# Patient Record
Sex: FEMALE | Race: BLACK OR AFRICAN AMERICAN | NOT HISPANIC OR LATINO | Employment: FULL TIME | URBAN - METROPOLITAN AREA
[De-identification: names, ages, dates, MRNs, and addresses within clinical notes are randomized per-mention and may not be internally consistent; named-entity substitution may affect disease eponyms.]

---

## 2017-07-07 ENCOUNTER — ALLSCRIPTS OFFICE VISIT (OUTPATIENT)
Dept: OTHER | Facility: OTHER | Age: 32
End: 2017-07-07

## 2017-07-07 ENCOUNTER — GENERIC CONVERSION - ENCOUNTER (OUTPATIENT)
Dept: OTHER | Facility: OTHER | Age: 32
End: 2017-07-07

## 2017-07-18 ENCOUNTER — ALLSCRIPTS OFFICE VISIT (OUTPATIENT)
Dept: OTHER | Facility: OTHER | Age: 32
End: 2017-07-18

## 2017-07-21 LAB
. (HISTORICAL): NORMAL

## 2017-07-23 ENCOUNTER — GENERIC CONVERSION - ENCOUNTER (OUTPATIENT)
Dept: OTHER | Facility: OTHER | Age: 32
End: 2017-07-23

## 2017-07-24 ENCOUNTER — GENERIC CONVERSION - ENCOUNTER (OUTPATIENT)
Dept: OTHER | Facility: OTHER | Age: 32
End: 2017-07-24

## 2018-01-12 VITALS
OXYGEN SATURATION: 96 % | WEIGHT: 150 LBS | HEART RATE: 80 BPM | DIASTOLIC BLOOD PRESSURE: 80 MMHG | HEIGHT: 64 IN | SYSTOLIC BLOOD PRESSURE: 124 MMHG | BODY MASS INDEX: 25.61 KG/M2

## 2018-01-12 NOTE — RESULT NOTES
Verified Results  [facility]) Pathology Report [id number] 09:48AM Manfred Anderson     Test Name Result Flag Reference     Comment     Material submitted:                                          SKIN BIOPSY, RIGHT LEG     Comment     Clinician provided ICD-10:  L81 9     Comment     Clinical history:                                            SUSPICIOUS LESION     Comment     **********************************************************************  Diagnosis:  SKIN BIOPSY, RIGHT LEG:  COMPOUND DYSPLASTIC NEVUS WITH MODERATE ATYPIA  MARGINS CLEAR  NOTE:  DEEPER SECTIONS, HMB45 AND MELAN A ARE CONFIRMATORY  NO PAGETOID NESTS  CONTROL(S) STAIN(S) APPROPRIATELY  [facility]/07/21/2017  **********************************************************************     Comment     Electronically signed:                                       Katlyn Joy MD, Pathologist     Comment     Gross description:                                            1 Container, formalin-filled, labeled with patient identification  SKIN BIOPSY, RIGHT LEG:  Received labeled RIGHT LEG is a tan skin ellipse measuring 1 0 x 0 4  x 0 1 cm  The specimen has a lesion measuring 0 2 x 0 2 cm  The  lesion lies within 1 mm of the closest margin  The specimen is inked  and sectioned serially with the ends submitted in cassette 1 and the  remaining sections in cassette(s) 2   /PER  /PER     Comment     Pathologist provided ICD-10:  D48 5     Comment     CPT                                                            221174, [id number], [id number]

## 2018-01-13 VITALS
WEIGHT: 150 LBS | OXYGEN SATURATION: 99 % | BODY MASS INDEX: 25.61 KG/M2 | SYSTOLIC BLOOD PRESSURE: 110 MMHG | DIASTOLIC BLOOD PRESSURE: 60 MMHG | HEART RATE: 60 BPM | RESPIRATION RATE: 16 BRPM | HEIGHT: 64 IN

## 2018-01-14 NOTE — RESULT NOTES
Message   Recorded as Task   Date: 07/23/2017 11:12 AM, Created By: Omega Barroso   Task Name: Call Patient with results   Assigned To:  Alfredito Lee   Regarding Patient: Ren Guerra, Status: Active   CommentCleave Rough - 23 Jul 2017 11:12 AM     Patient Phone: [phone]    pre-cancer --clean margins   Maureen Holloway - 24 Jul 2017 9:58 AM     TASK EDITED  Results left on C#        Signatures   Electronically signed by : Carlos Arzate, ; Jul 24 2017  9:59AM EST                       (Author)

## 2018-01-27 ENCOUNTER — OFFICE VISIT (OUTPATIENT)
Dept: FAMILY MEDICINE CLINIC | Facility: CLINIC | Age: 33
End: 2018-01-27
Payer: COMMERCIAL

## 2018-01-27 VITALS
HEART RATE: 96 BPM | DIASTOLIC BLOOD PRESSURE: 72 MMHG | BODY MASS INDEX: 23.9 KG/M2 | HEIGHT: 64 IN | SYSTOLIC BLOOD PRESSURE: 116 MMHG | WEIGHT: 140 LBS | TEMPERATURE: 97.8 F | OXYGEN SATURATION: 98 %

## 2018-01-27 DIAGNOSIS — J01.00 ACUTE NON-RECURRENT MAXILLARY SINUSITIS: Primary | ICD-10-CM

## 2018-01-27 DIAGNOSIS — K12.1 MOUTH ULCERS: ICD-10-CM

## 2018-01-27 PROCEDURE — 99214 OFFICE O/P EST MOD 30 MIN: CPT | Performed by: FAMILY MEDICINE

## 2018-01-27 RX ORDER — AMOXICILLIN AND CLAVULANATE POTASSIUM 875; 125 MG/1; MG/1
1 TABLET, FILM COATED ORAL EVERY 12 HOURS SCHEDULED
Qty: 20 TABLET | Refills: 0 | Status: SHIPPED | OUTPATIENT
Start: 2018-01-27 | End: 2018-02-06

## 2018-01-27 RX ORDER — LEVONORGESTREL / ETHINYL ESTRADIOL AND ETHINYL ESTRADIOL 150-30(84)
1 KIT ORAL DAILY
Refills: 0 | COMMUNITY
Start: 2018-01-06 | End: 2020-11-02 | Stop reason: ALTCHOICE

## 2018-01-27 RX ORDER — DESVENLAFAXINE 50 MG/1
50 TABLET, EXTENDED RELEASE ORAL DAILY
Refills: 0 | COMMUNITY
Start: 2018-01-04 | End: 2020-01-22 | Stop reason: DRUGHIGH

## 2018-01-27 RX ORDER — VALACYCLOVIR HYDROCHLORIDE 1 G/1
TABLET, FILM COATED ORAL
Qty: 60 TABLET | Refills: 2 | Status: SHIPPED | OUTPATIENT
Start: 2018-01-27 | End: 2020-01-22 | Stop reason: ALTCHOICE

## 2018-01-27 RX ORDER — LORAZEPAM 0.5 MG/1
1 TABLET ORAL
COMMUNITY
Start: 2015-11-18 | End: 2018-02-14 | Stop reason: SDUPTHER

## 2018-01-27 RX ORDER — TRIAMCINOLONE ACETONIDE 0.1 %
1 PASTE (GRAM) DENTAL 4 TIMES DAILY
Qty: 5 G | Refills: 2 | Status: SHIPPED | OUTPATIENT
Start: 2018-01-27 | End: 2019-03-07 | Stop reason: ALTCHOICE

## 2018-01-27 NOTE — PROGRESS NOTES
Assessment/Plan:    No problem-specific Assessment & Plan notes found for this encounter  Diagnoses and all orders for this visit:    Acute non-recurrent maxillary sinusitis  -     amoxicillin-clavulanate (AUGMENTIN) 875-125 mg per tablet; Take 1 tablet by mouth every 12 (twelve) hours for 10 days    Mouth ulcers  -     triamcinolone (KENALOG) 0 1 % oral topical paste; Apply 1 application topically 4 (four) times a day  -     valACYclovir (VALTREX) 1,000 mg tablet; Take twice daily X 7 days, then continue 1 tab daily    Other orders  -     LORazepam (ATIVAN) 0 5 mg tablet; Take 1 tablet by mouth  -     CAMRESE 0 15-0 03 &0 01 MG TABS; Take 1 tablet by mouth daily  -     desvenlafaxine succinate (PRISTIQ) 50 mg 24 hr tablet; Take 50 mg by mouth daily          Augmentin times 10 days for sinus infection  Will give a trial of Kenalog in Or[place] for the acute ulcers, and also oral Valtrex twice daily for 1 week and then once daily as a suppressive attempt  Progress report on the persistent ulcers in 3-6 weeks  Work note provided  Subjective:      Patient ID: Raymond Ballesteros is a 29 y o  female  Acute visit for illness -patient been ill for the last 2 days with cough, nasal congestion, muscle aches, and headaches  She has a dry throat  No fevers have been reported  The following portions of the patient's history were reviewed and updated as appropriate: allergies, current medications, past family history, past medical history, past social history and problem list     Review of Systems   Constitutional: Positive for chills and fatigue  Negative for diaphoresis and fever  HENT: Positive for congestion, ear pain, sinus pressure, sore throat and trouble swallowing  Negative for rhinorrhea  Eyes: Negative for discharge, redness and itching  Respiratory: Positive for cough  Negative for chest tightness, shortness of breath and wheezing  Cardiovascular: Negative for chest pain and palpitations  Rapid or slow heart rate   Gastrointestinal: Positive for diarrhea and nausea  Negative for abdominal pain and vomiting  Objective:     Physical Exam   Constitutional: She appears well-developed and well-nourished  No distress  HENT:   Head: Normocephalic and atraumatic  Right Ear: Tympanic membrane and external ear normal  No drainage or tenderness  Left Ear: Tympanic membrane normal  No drainage or tenderness  Nose: Mucosal edema and rhinorrhea present  Mouth/Throat: Posterior oropharyngeal erythema present  Multiple ulceration at nares  Ulcers across lower lip mucus membranes   Eyes: Conjunctivae and EOM are normal  Pupils are equal, round, and reactive to light  Neck: Normal range of motion  Neck supple  Cardiovascular: Normal rate, regular rhythm and normal heart sounds  Pulmonary/Chest: Effort normal  No respiratory distress  She has no wheezes  She has no rales  Abdominal: Soft  Lymphadenopathy:     She has cervical adenopathy  Skin: Skin is warm and dry     Mucosal ulcers in mouth

## 2018-01-27 NOTE — LETTER
January 27, 2018     Patient: Graydon Goldberg   YOB: 1985   Date of Visit: 1/27/2018       To Whom it May Concern:    Graydon Goldberg is under my professional care  She was seen in my office on 1/27/2018  She may return to work on 1/29/2018  Please excuse work absence due to illness  If you have any questions or concerns, please don't hesitate to call           Sincerely,          Saeed Agrawal MD        CC: No Recipients

## 2018-01-27 NOTE — PATIENT INSTRUCTIONS
Augmentin times 10 days for sinus infection  Will give a trial of Kenalog in Or[place] for the acute ulcers, and also oral Valtrex twice daily for 1 week and then once daily as a suppressive attempt  Progress report on the persistent ulcers in 3-6 weeks  Work note provided

## 2018-02-02 ENCOUNTER — OFFICE VISIT (OUTPATIENT)
Dept: URGENT CARE | Facility: CLINIC | Age: 33
End: 2018-02-02
Payer: COMMERCIAL

## 2018-02-02 ENCOUNTER — TELEPHONE (OUTPATIENT)
Dept: FAMILY MEDICINE CLINIC | Facility: CLINIC | Age: 33
End: 2018-02-02

## 2018-02-02 VITALS
WEIGHT: 146 LBS | OXYGEN SATURATION: 97 % | DIASTOLIC BLOOD PRESSURE: 91 MMHG | RESPIRATION RATE: 18 BRPM | BODY MASS INDEX: 24.92 KG/M2 | HEART RATE: 87 BPM | TEMPERATURE: 98.8 F | SYSTOLIC BLOOD PRESSURE: 148 MMHG | HEIGHT: 64 IN

## 2018-02-02 DIAGNOSIS — G44.52 NEW DAILY PERSISTENT HEADACHE: ICD-10-CM

## 2018-02-02 DIAGNOSIS — J06.9 VIRAL UPPER RESPIRATORY TRACT INFECTION: Primary | ICD-10-CM

## 2018-02-02 PROCEDURE — 99203 OFFICE O/P NEW LOW 30 MIN: CPT | Performed by: PREVENTIVE MEDICINE

## 2018-02-02 RX ORDER — BUTALBITAL, ACETAMINOPHEN AND CAFFEINE 50; 325; 40 MG/1; MG/1; MG/1
1 TABLET ORAL EVERY 4 HOURS PRN
Qty: 15 TABLET | Refills: 0
Start: 2018-02-02 | End: 2019-03-07 | Stop reason: ALTCHOICE

## 2018-02-02 RX ORDER — FLUOXETINE HYDROCHLORIDE 20 MG/1
CAPSULE ORAL
COMMUNITY
Start: 2016-06-08 | End: 2019-03-07 | Stop reason: ALTCHOICE

## 2018-02-02 NOTE — TELEPHONE ENCOUNTER
Spoke to pt--is aware possible flu--to continue with the instructions given yesterday, plenty of fluids, tylenol/motrin  Sohan Valenzuela

## 2018-02-02 NOTE — TELEPHONE ENCOUNTER
HEADACHES,NAUSEA,AND FELL EXTREMLY WEEK  Antonieta Jurist DID EVERYTHING WE TOLD HER TO Do   WHAT TO DO NEXT

## 2018-02-03 NOTE — PROGRESS NOTES
Assessment/Plan:      Diagnoses and all orders for this visit:    Viral upper respiratory tract infection  -     butalbital-acetaminophen-caffeine (FIORICET,ESGIC) -40 mg per tablet; Take 1 tablet by mouth every 4 (four) hours as needed for headaches for up to 15 doses    New daily persistent headache    Other orders  -     FLUoxetine (PROZAC) 20 mg capsule; Take by mouth          Subjective:     Patient ID: Alexi Purcell is a 29 y o  female  Multiple complaints  She saw her family doctor approximately a week ago for nausea congestion and headache she was put on amoxicillin for which she is still taking  Today she has ongoing nausea she has severe headaches which alternate between temporal size of the face and she has head congestion and cough  Sinusitis   Associated symptoms include headaches and neck pain  Headache    Associated symptoms include nausea and neck pain  Dental Pain      Neck Pain    Associated symptoms include headaches  Nausea   Associated symptoms include fatigue, headaches, nausea and neck pain  Fatigue   Associated symptoms include fatigue, headaches, nausea and neck pain  Mouth Lesions    Associated symptoms include nausea, headaches, mouth sores and neck pain  Review of Systems   Constitutional: Positive for fatigue  HENT: Positive for mouth sores  Gastrointestinal: Positive for nausea  Musculoskeletal: Positive for neck pain  Neurological: Positive for headaches  Objective:     Physical Exam   Constitutional: She appears well-developed  HENT:   Right Ear: External ear normal    Left Ear: External ear normal    Mouth/Throat: Oropharynx is clear and moist  No oropharyngeal exudate  Cardiovascular: Regular rhythm and normal heart sounds  Pulmonary/Chest: Breath sounds normal    Abdominal: Soft  She exhibits no mass  There is no tenderness  There is no rebound  Lymphadenopathy:     She has no cervical adenopathy

## 2018-02-03 NOTE — PATIENT INSTRUCTIONS
If you're not improving in the next 2-3 days you must be recheck early next week by her family physician

## 2018-02-14 ENCOUNTER — OFFICE VISIT (OUTPATIENT)
Dept: FAMILY MEDICINE CLINIC | Facility: CLINIC | Age: 33
End: 2018-02-14
Payer: COMMERCIAL

## 2018-02-14 VITALS
SYSTOLIC BLOOD PRESSURE: 120 MMHG | HEIGHT: 64 IN | BODY MASS INDEX: 23.9 KG/M2 | DIASTOLIC BLOOD PRESSURE: 84 MMHG | WEIGHT: 140 LBS

## 2018-02-14 DIAGNOSIS — J01.01 ACUTE RECURRENT MAXILLARY SINUSITIS: Primary | ICD-10-CM

## 2018-02-14 DIAGNOSIS — F41.9 ANXIETY: ICD-10-CM

## 2018-02-14 PROCEDURE — 99213 OFFICE O/P EST LOW 20 MIN: CPT | Performed by: FAMILY MEDICINE

## 2018-02-14 RX ORDER — HYDROCODONE BITARTRATE AND ACETAMINOPHEN 5; 325 MG/1; MG/1
1 TABLET ORAL EVERY 6 HOURS PRN
Qty: 20 TABLET | Refills: 0 | Status: SHIPPED | OUTPATIENT
Start: 2018-02-14 | End: 2019-03-07 | Stop reason: ALTCHOICE

## 2018-02-14 RX ORDER — AZITHROMYCIN 250 MG/1
TABLET, FILM COATED ORAL
Qty: 6 TABLET | Refills: 0 | Status: SHIPPED | OUTPATIENT
Start: 2018-02-14 | End: 2018-02-19

## 2018-02-14 RX ORDER — LORAZEPAM 0.5 MG/1
0.5 TABLET ORAL EVERY 8 HOURS PRN
Qty: 60 TABLET | Refills: 5 | Status: SHIPPED | OUTPATIENT
Start: 2018-02-14 | End: 2018-11-09 | Stop reason: SDUPTHER

## 2018-02-14 NOTE — PROGRESS NOTES
Assessment/Plan:    No problem-specific Assessment & Plan notes found for this encounter  Diagnoses and all orders for this visit:    Acute recurrent maxillary sinusitis  -     azithromycin (ZITHROMAX) 250 mg tablet; 2 tabs Day 1, then 1 tab daily X 4 days  -     HYDROcodone-acetaminophen (NORCO) 5-325 mg per tablet; Take 1 tablet by mouth every 6 (six) hours as needed for pain Max Daily Amount: 4 tablets    Anxiety  -     LORazepam (ATIVAN) 0 5 mg tablet; Take 1 tablet (0 5 mg total) by mouth every 8 (eight) hours as needed for anxiety        Patient Instructions   incr fluids/add Zpk    Patient Instructions   incr fluids/add Zpk        Subjective: No chief complaint on file  Patient ID: Milad Drew is a 29 y o  female  Sinus pain worse---no resp to amox            Review of Systems   Constitutional: Negative for appetite change, chills, diaphoresis and fever  HENT: Positive for sinus pain and sinus pressure  Negative for congestion, ear discharge, ear pain, rhinorrhea and sore throat  Eyes: Negative for discharge, redness and itching  Respiratory: Negative for cough, chest tightness, shortness of breath and wheezing  Cardiovascular: Negative for chest pain and palpitations  Rapid or slow heart rate   Gastrointestinal: Negative for abdominal pain, diarrhea, nausea and vomiting  Objective: There were no vitals filed for this visit  Physical Exam   Constitutional: She is oriented to person, place, and time  She appears well-developed and well-nourished  No distress  HENT:   Head: Normocephalic and atraumatic  Right Ear: Tympanic membrane, external ear and ear canal normal    Left Ear: Tympanic membrane, external ear and ear canal normal    Nose: Rhinorrhea present  No epistaxis  Right sinus exhibits maxillary sinus tenderness  Left sinus exhibits maxillary sinus tenderness     Mouth/Throat: Uvula is midline, oropharynx is clear and moist and mucous membranes are normal    Cardiovascular: Normal rate, regular rhythm and normal heart sounds  Exam reveals no gallop and no friction rub  No murmur heard  Pulmonary/Chest: Effort normal and breath sounds normal  No respiratory distress  She has no wheezes  She has no rales  Cough noted   Abdominal: She exhibits no distension  There is no tenderness  Lymphadenopathy:        Head (right side): Submandibular adenopathy present  Head (left side): Submandibular adenopathy present  Neurological: She is alert and oriented to person, place, and time  Skin: She is not diaphoretic  Psychiatric: She has a normal mood and affect  Her behavior is normal  Judgment and thought content normal    Nursing note and vitals reviewed      Patient Instructions     Patient Instructions

## 2018-02-19 ENCOUNTER — TELEPHONE (OUTPATIENT)
Dept: FAMILY MEDICINE CLINIC | Facility: CLINIC | Age: 33
End: 2018-02-19

## 2018-02-19 DIAGNOSIS — R05.9 COUGH: Primary | ICD-10-CM

## 2018-02-19 RX ORDER — PREDNISONE 20 MG/1
TABLET ORAL
Qty: 15 TABLET | Refills: 0 | Status: SHIPPED | OUTPATIENT
Start: 2018-02-19 | End: 2019-03-07 | Stop reason: ALTCHOICE

## 2018-02-19 NOTE — TELEPHONE ENCOUNTER
WAS SEEN 2/14/18 AND PUT ON Z-KAREN, HAD AMOX PRIOR TO THAT  IS STILL HAVING A DEEP PRODUCTIVE COUGH, SINUS PRESSURE, YELLOW PHLEGM  NO FEVER  ASKING WHAT SHE SHOULD Do?     [facility]

## 2018-11-09 DIAGNOSIS — F41.9 ANXIETY: ICD-10-CM

## 2018-11-09 RX ORDER — LORAZEPAM 0.5 MG/1
0.5 TABLET ORAL EVERY 8 HOURS PRN
Qty: 60 TABLET | Refills: 0 | Status: SHIPPED | OUTPATIENT
Start: 2018-11-09 | End: 2019-03-07 | Stop reason: SDUPTHER

## 2018-11-26 ENCOUNTER — OFFICE VISIT (OUTPATIENT)
Dept: FAMILY MEDICINE CLINIC | Facility: CLINIC | Age: 33
End: 2018-11-26
Payer: COMMERCIAL

## 2018-11-26 VITALS
HEIGHT: 64 IN | SYSTOLIC BLOOD PRESSURE: 110 MMHG | OXYGEN SATURATION: 97 % | BODY MASS INDEX: 27.31 KG/M2 | RESPIRATION RATE: 16 BRPM | WEIGHT: 160 LBS | DIASTOLIC BLOOD PRESSURE: 60 MMHG | TEMPERATURE: 98.3 F | HEART RATE: 70 BPM

## 2018-11-26 DIAGNOSIS — J01.10 ACUTE NON-RECURRENT FRONTAL SINUSITIS: Primary | ICD-10-CM

## 2018-11-26 PROCEDURE — 99213 OFFICE O/P EST LOW 20 MIN: CPT | Performed by: FAMILY MEDICINE

## 2018-11-26 NOTE — PROGRESS NOTES
[address]        NAME: Марина Mcdermott is a 34 y o  female  : 1985    MRN: [id number]  DATE: 2018  TIME: 2:15 PM    Assessment and Plan   Acute non-recurrent frontal sinusitis [J01 10]  1  Acute non-recurrent frontal sinusitis         No problem-specific Assessment & Plan notes found for this encounter  Patient Instructions     Patient Instructions   This appears to be a viral infection  This could be some early gum disease, however I see nothing there except mild redness  I would treat her symptoms using Tylenol or ibuprofen  I will give you a written prescription for amoxicillin to be filled if symptoms get worse as we discussed  Chief Complaint     Chief Complaint   Patient presents with    Pain     swollen lymphnodes          History of Present Illness       1-2 days of frontal sinus pressure  This is associated with gum soreness on the left lower jaw  There is also adenopathy in the submandibular region left side greater than the right side  No fevers are noted  No significant cough  No night sweats are reported  No local tooth sensitivity  Review of Systems   Review of Systems   Constitutional: Negative for appetite change, chills, diaphoresis and fever  HENT: Positive for congestion  Negative for ear pain, rhinorrhea, sinus pressure and sore throat  Soreness in the mandibular gums on the left side  Eyes: Negative for discharge, redness and itching  Respiratory: Negative for cough, shortness of breath and wheezing  Cardiovascular: Negative for chest pain and palpitations  Rapid or slow heart rate   Gastrointestinal: Positive for nausea  Negative for abdominal pain, diarrhea and vomiting           Current Medications       Current Outpatient Prescriptions:     butalbital-acetaminophen-caffeine (FIORICET,ESGIC) -40 mg per tablet, Take 1 tablet by mouth every 4 (four) hours as needed for headaches for up to 15 doses, Disp: 15 tablet, Rfl: 0    CAMRESE 0 15-0 03 &0 01 MG TABS, Take 1 tablet by mouth daily, Disp: , Rfl: 0    desvenlafaxine succinate (PRISTIQ) 50 mg 24 hr tablet, Take 50 mg by mouth daily, Disp: , Rfl: 0    FLUoxetine (PROZAC) 20 mg capsule, Take by mouth, Disp: , Rfl:     HYDROcodone-acetaminophen (NORCO) 5-325 mg per tablet, Take 1 tablet by mouth every 6 (six) hours as needed for pain Max Daily Amount: 4 tablets, Disp: 20 tablet, Rfl: 0    LORazepam (ATIVAN) 0 5 mg tablet, Take 1 tablet (0 5 mg total) by mouth every 8 (eight) hours as needed for anxiety, Disp: 60 tablet, Rfl: 0    predniSONE 20 mg tablet, BID X 5 DAYS THEN QD X 5 DAYS, Disp: 15 tablet, Rfl: 0    triamcinolone (KENALOG) 0 1 % oral topical paste, Apply 1 application topically 4 (four) times a day, Disp: 5 g, Rfl: 2    valACYclovir (VALTREX) 1,000 mg tablet, Take twice daily X 7 days, then continue 1 tab daily, Disp: 60 tablet, Rfl: 2    Current Allergies     Allergies as of 11/26/2018    (No Known Allergies)            The following portions of the patient's history were reviewed and updated as appropriate: allergies, current medications, past family history, past medical history, past social history, past surgical history and problem list      Past Medical History:   Diagnosis Date    Positive depression screening     resolved 7/18/17       No past surgical history on file  Family History   Problem Relation Age of Onset    Hypertension Father     Alcohol abuse Father     Alcohol abuse Sister     Depression Sister     Anxiety disorder Sister     Leukemia Maternal Grandmother     Depression Maternal Grandmother     Diabetes Paternal Grandfather          Medications have been verified          Objective   /60 (BP Location: Left arm, Patient Position: Sitting, Cuff Size: Standard)   Pulse 70   Temp 98 3 °F (36 8 °C) (Oral)   Resp 16   Ht 5' 4" (1 626 m)   Wt 72 6 kg (160 lb)   SpO2 97%   BMI 27 46 kg/m²        Physical Exam     Physical Exam   Constitutional: She appears well-developed and well-nourished  No distress  HENT:   Head: Normocephalic and atraumatic  Right Ear: Tympanic membrane and external ear normal  No drainage  Left Ear: Tympanic membrane normal  No drainage  Nose: Right sinus exhibits frontal sinus tenderness  Right sinus exhibits no maxillary sinus tenderness  Left sinus exhibits frontal sinus tenderness  Left sinus exhibits no maxillary sinus tenderness  Mouth/Throat: No oropharyngeal exudate or posterior oropharyngeal erythema  Mild erythema of the gums on the left side lower jaw  Eyes: Conjunctivae and EOM are normal  Right eye exhibits no discharge  Left eye exhibits no discharge  Neck: Normal range of motion  Neck supple  No thyromegaly present  Cardiovascular: Normal rate, regular rhythm and normal heart sounds  Pulmonary/Chest: Effort normal  No respiratory distress  She has no wheezes  She has no rales  Abdominal: Soft  Lymphadenopathy:        Head (right side): Submandibular and tonsillar adenopathy present  Head (left side): Submandibular adenopathy present  No tonsillar adenopathy present  She has cervical adenopathy  Right cervical: No superficial cervical adenopathy present  Left cervical: No superficial cervical adenopathy present  Skin: Skin is warm and dry

## 2018-11-26 NOTE — PATIENT INSTRUCTIONS
This appears to be a viral infection  This could be some early gum disease, however I see nothing there except mild redness  I would treat her symptoms using Tylenol or ibuprofen  I will give you a written prescription for amoxicillin to be filled if symptoms get worse as we discussed

## 2019-03-07 ENCOUNTER — OFFICE VISIT (OUTPATIENT)
Dept: FAMILY MEDICINE CLINIC | Facility: CLINIC | Age: 34
End: 2019-03-07
Payer: COMMERCIAL

## 2019-03-07 VITALS
WEIGHT: 165 LBS | DIASTOLIC BLOOD PRESSURE: 80 MMHG | OXYGEN SATURATION: 98 % | HEIGHT: 64 IN | BODY MASS INDEX: 28.17 KG/M2 | SYSTOLIC BLOOD PRESSURE: 122 MMHG | HEART RATE: 89 BPM

## 2019-03-07 DIAGNOSIS — M72.2 PLANTAR FASCIITIS: ICD-10-CM

## 2019-03-07 DIAGNOSIS — Z00.00 WELLNESS EXAMINATION: ICD-10-CM

## 2019-03-07 DIAGNOSIS — L30.9 ECZEMA, UNSPECIFIED TYPE: Primary | ICD-10-CM

## 2019-03-07 DIAGNOSIS — F41.9 ANXIETY: ICD-10-CM

## 2019-03-07 PROCEDURE — 99213 OFFICE O/P EST LOW 20 MIN: CPT | Performed by: FAMILY MEDICINE

## 2019-03-07 PROCEDURE — 99395 PREV VISIT EST AGE 18-39: CPT | Performed by: FAMILY MEDICINE

## 2019-03-07 RX ORDER — LORAZEPAM 0.5 MG/1
0.5 TABLET ORAL EVERY 8 HOURS PRN
Qty: 60 TABLET | Refills: 5 | Status: SHIPPED | OUTPATIENT
Start: 2019-03-07 | End: 2020-01-15 | Stop reason: SDUPTHER

## 2019-03-07 RX ORDER — TRIAMCINOLONE ACETONIDE 5 MG/G
CREAM TOPICAL 3 TIMES DAILY
Qty: 60 G | Refills: 1 | Status: SHIPPED | OUTPATIENT
Start: 2019-03-07 | End: 2020-11-27 | Stop reason: ALTCHOICE

## 2019-03-07 NOTE — PROGRESS NOTES
[facility]        NAME: Milad Drew is a 34 y o  female  : 1985    MRN: [id number]  DATE: 2019  TIME: 10:22 AM    Assessment and Plan   Eczema, unspecified type [L30 9]  1  Eczema, unspecified type     2  Anxiety  LORazepam (ATIVAN) 0 5 mg tablet   3  Wellness examination     4  Plantar fasciitis           Patient Instructions     Patient Instructions   Inject fasciitis if worse          Chief Complaint     Chief Complaint   Patient presents with    Annual Exam    Rash     3-4 weeks     Plantar Fasciitis         History of Present Illness       C/o dermatitis R forearm//R plantar fasciitis      Review of Systems   Review of Systems   Constitutional: Negative for appetite change, chills, diaphoresis and fever  HENT: Negative for ear pain, rhinorrhea, sinus pressure and sore throat  Eyes: Negative for discharge, redness and itching  Respiratory: Negative for cough, shortness of breath and wheezing  Cardiovascular: Negative for chest pain and palpitations  Gastrointestinal: Negative for abdominal pain, diarrhea, nausea and vomiting     Musculoskeletal:        See HPI   Skin:        See HPI         Current Medications       Current Outpatient Medications:     CAMRESE 0 15-0 03 &0 01 MG TABS, Take 1 tablet by mouth daily, Disp: , Rfl: 0    desvenlafaxine succinate (PRISTIQ) 50 mg 24 hr tablet, Take 50 mg by mouth daily, Disp: , Rfl: 0    LORazepam (ATIVAN) 0 5 mg tablet, Take 1 tablet (0 5 mg total) by mouth every 8 (eight) hours as needed for anxiety, Disp: 60 tablet, Rfl: 5    valACYclovir (VALTREX) 1,000 mg tablet, Take twice daily X 7 days, then continue 1 tab daily, Disp: 60 tablet, Rfl: 2    Current Allergies     Allergies as of 2019    (No Known Allergies)            The following portions of the patient's history were reviewed and updated as appropriate: allergies, current medications, past family history, past medical history, past social history, past surgical history and problem list      Past Medical History:   Diagnosis Date    Positive depression screening     resolved 7/18/17       No past surgical history on file  Family History   Problem Relation Age of Onset    Hypertension Father     Alcohol abuse Father     Alcohol abuse Sister     Depression Sister     Anxiety disorder Sister     Leukemia Maternal Grandmother     Depression Maternal Grandmother     Diabetes Paternal Grandfather          Medications have been verified  Objective   /80   Pulse 89   Ht 5' 4" (1 626 m)   Wt 74 8 kg (165 lb)   SpO2 98%   BMI 28 32 kg/m²        Physical Exam     Physical Exam   Constitutional: She appears well-developed and well-nourished  No distress  HENT:   Right Ear: Tympanic membrane, external ear and ear canal normal  Tympanic membrane is not injected  Left Ear: Tympanic membrane, external ear and ear canal normal  Tympanic membrane is not injected  Nose: Nose normal    Mouth/Throat: Oropharynx is clear and moist and mucous membranes are normal    Eyes: Pupils are equal, round, and reactive to light  Conjunctivae and EOM are normal  Right eye exhibits no discharge  Left eye exhibits no discharge  Neck: Normal range of motion  Neck supple  No thyromegaly present  Cardiovascular: Normal rate, regular rhythm and normal heart sounds  No murmur heard  Pulmonary/Chest: Effort normal and breath sounds normal  No respiratory distress  She has no wheezes  Musculoskeletal:   R heel c/w w/ plantar fasc   Lymphadenopathy:     She has no cervical adenopathy  Skin: She is not diaphoretic  Nursing note and vitals reviewed

## 2019-03-07 NOTE — PROGRESS NOTES
HPI:  Milad Drew is a 34 y o  female here for her yearly health maintenance exam    Patient Active Problem List   Diagnosis    Depression    Anxiety     Past Medical History:   Diagnosis Date    Positive depression screening     resolved 7/18/17       1  Advanced Directive: n     2  Durable Power of  for Healthcare: n     3  Social History:           Drug and alcohol History: n                  4  Immunizations up to date: y                 Lifestyle:                           Healthy Diet:y                          Alcohol Use:y                          Tobacco Use:n                          Regular exercise:y                          Weight concerns:n                               5  Over the past 2 weeks, how often have you been bothered by the following:              Little interest or pleasure in doing things:n              Felling down, depressed or hopeless:n       Current Outpatient Medications   Medication Sig Dispense Refill    CAMRESE 0 15-0 03 &0 01 MG TABS Take 1 tablet by mouth daily  0    desvenlafaxine succinate (PRISTIQ) 50 mg 24 hr tablet Take 50 mg by mouth daily  0    LORazepam (ATIVAN) 0 5 mg tablet Take 1 tablet (0 5 mg total) by mouth every 8 (eight) hours as needed for anxiety 60 tablet 5    valACYclovir (VALTREX) 1,000 mg tablet Take twice daily X 7 days, then continue 1 tab daily 60 tablet 2     No current facility-administered medications for this visit  No Known Allergies  Immunization History   Administered Date(s) Administered    Tdap 04/19/2012       Patient Care Team:  Hugh Keenan MD as PCP - General    Review of Systems   Constitutional: Negative for fatigue, fever and unexpected weight change  HENT: Negative for congestion, sinus pain and sore throat  Eyes: Negative for visual disturbance  Respiratory: Negative for shortness of breath and wheezing  Cardiovascular: Negative for chest pain and palpitations     Gastrointestinal: Negative for abdominal pain, nausea and vomiting  Musculoskeletal: Negative  Negative for arthralgias and myalgias  Neurological: Negative for syncope, weakness and numbness  Psychiatric/Behavioral: Negative  Negative for confusion, dysphoric mood and suicidal ideas  Physical Exam :  Physical Exam   Constitutional: She is oriented to person, place, and time  Vital signs are normal  She appears well-developed and well-nourished  HENT:   Right Ear: Ear canal normal  Tympanic membrane is not injected  Left Ear: Ear canal normal  Tympanic membrane is not injected  Nose: Nose normal    Mouth/Throat: Oropharynx is clear and moist    Eyes: Pupils are equal, round, and reactive to light  Conjunctivae and EOM are normal  Right eye exhibits no discharge  Left eye exhibits no discharge  Neck: Normal range of motion  Neck supple  No thyromegaly present  Cardiovascular: Normal rate, regular rhythm and normal heart sounds  No murmur heard  Pulmonary/Chest: Effort normal and breath sounds normal  No respiratory distress  She has no wheezes  Abdominal: Soft  Bowel sounds are normal  She exhibits no distension  There is no tenderness  Musculoskeletal: Normal range of motion  Lymphadenopathy:     She has no cervical adenopathy  Neurological: She is alert and oriented to person, place, and time  She has normal strength and normal reflexes  She is not disoriented  No sensory deficit  Gait normal    Skin: Skin is warm and dry  Psychiatric: She has a normal mood and affect  Her speech is normal and behavior is normal  Judgment and thought content normal  Cognition and memory are normal          Assessment and Plan:  1  Eczema, unspecified type     2  Anxiety  LORazepam (ATIVAN) 0 5 mg tablet   3  Wellness examination     4   Plantar fasciitis         Health Maintenance Due   Topic Date Due    BMI: Followup Plan  04/03/2005    INFLUENZA VACCINE  07/01/2018

## 2019-11-29 ENCOUNTER — TELEPHONE (OUTPATIENT)
Dept: FAMILY MEDICINE CLINIC | Facility: CLINIC | Age: 34
End: 2019-11-29

## 2019-11-29 DIAGNOSIS — J03.90 TONSILLITIS: Primary | ICD-10-CM

## 2019-11-29 RX ORDER — AMOXICILLIN 500 MG/1
500 CAPSULE ORAL EVERY 8 HOURS SCHEDULED
Qty: 30 CAPSULE | Refills: 0 | Status: SHIPPED | OUTPATIENT
Start: 2019-11-29 | End: 2019-12-09

## 2019-11-29 NOTE — TELEPHONE ENCOUNTER
Patient says she has an ear infection  asking for amoxicillin order- [facility] [place]  Thank you

## 2020-01-15 DIAGNOSIS — F41.9 ANXIETY: ICD-10-CM

## 2020-01-15 RX ORDER — LORAZEPAM 0.5 MG/1
0.5 TABLET ORAL EVERY 8 HOURS PRN
Qty: 60 TABLET | Refills: 1 | Status: SHIPPED | OUTPATIENT
Start: 2020-01-15 | End: 2021-07-08 | Stop reason: SDUPTHER

## 2020-01-15 NOTE — TELEPHONE ENCOUNTER
Patient needs refill for ativan- to [facility] " pillpack by [employer]"   [address] [place], [place]  Needs  To be e- scribed per pharmacy

## 2020-01-15 NOTE — TELEPHONE ENCOUNTER
Approve  PDMP--Ativan last filled--No fills shown on PDMP--spoke to pharmacy states last filled 3/7/19    MM/HM--3/2020

## 2020-01-22 ENCOUNTER — OFFICE VISIT (OUTPATIENT)
Dept: FAMILY MEDICINE CLINIC | Facility: CLINIC | Age: 35
End: 2020-01-22
Payer: COMMERCIAL

## 2020-01-22 VITALS
SYSTOLIC BLOOD PRESSURE: 110 MMHG | BODY MASS INDEX: 28.27 KG/M2 | HEART RATE: 95 BPM | WEIGHT: 165.6 LBS | DIASTOLIC BLOOD PRESSURE: 70 MMHG | TEMPERATURE: 98.3 F | HEIGHT: 64 IN | OXYGEN SATURATION: 97 %

## 2020-01-22 DIAGNOSIS — J11.1 INFLUENZA: Primary | ICD-10-CM

## 2020-01-22 PROCEDURE — 99213 OFFICE O/P EST LOW 20 MIN: CPT | Performed by: FAMILY MEDICINE

## 2020-01-22 RX ORDER — OSELTAMIVIR PHOSPHATE 75 MG/1
75 CAPSULE ORAL EVERY 12 HOURS SCHEDULED
Qty: 10 CAPSULE | Refills: 0 | Status: SHIPPED | OUTPATIENT
Start: 2020-01-22 | End: 2020-01-27

## 2020-01-22 RX ORDER — DESVENLAFAXINE 100 MG/1
TABLET, EXTENDED RELEASE ORAL
COMMUNITY
Start: 2019-12-19 | End: 2020-11-02 | Stop reason: HOSPADM

## 2020-01-22 NOTE — PROGRESS NOTES
[id number]         NAME: Amelie Lucas is a 30 y o  female  : 1985    MRN: [id number]  DATE: 2020  TIME: 9:24 AM    Assessment and Plan   Influenza [J11 1]  1  Influenza  oseltamivir (TAMIFLU) 75 mg capsule   2  BMI 27 0-27 9,adult         Current episode of major depressive disorder without prior episode  Patient does remain stable on Pristiq  This is prescribed by a different provider  Patient Instructions     Patient Instructions     Start Tamiflu twice daily for 5 days  Over-the-counter medications as discussed for other symptoms  Keep herself well hydrated  I would not return to work until your fevers are down and you're not coughing significantly  Influenza   WHAT YOU NEED TO KNOW:   Influenza (the flu) is an infection caused by the influenza virus  The flu is easily spread when an infected person coughs, sneezes, or has close contact with others  You may be able to spread the flu to others for 1 week or longer after signs or symptoms appear  DISCHARGE INSTRUCTIONS:   Call 911 for any of the following:   · You have trouble breathing, and your lips look purple or blue  · You have a seizure  Return to the emergency department if:   · You are dizzy, or you are urinating less or not at all  · You have a headache with a stiff neck, and you feel tired or confused  · You have new pain or pressure in your chest     · Your symptoms, such as shortness of breath, vomiting, or diarrhea, get worse  · Your symptoms, such as fever and coughing, seem to get better, but then get worse  Contact your healthcare provider if:   · You have new muscle pain or weakness  · You have questions or concerns about your condition or care  Medicines: You may need any of the following:  · Acetaminophen  decreases pain and fever  It is available without a doctor's order  Ask how much to take and how often to take it  Follow directions   Acetaminophen can cause liver damage if not taken correctly  · NSAIDs , such as ibuprofen, help decrease swelling, pain, and fever  This medicine is available with or without a doctor's order  NSAIDs can cause stomach bleeding or kidney problems in certain people  If you take blood thinner medicine, always ask your healthcare provider if NSAIDs are safe for you  Always read the medicine label and follow directions  · Antivirals  help fight a viral infection  · Take your medicine as directed  Contact your healthcare provider if you think your medicine is not helping or if you have side effects  Tell him or her if you are allergic to any medicine  Keep a list of the medicines, vitamins, and herbs you take  Include the amounts, and when and why you take them  Bring the list or the pill bottles to follow-up visits  Carry your medicine list with you in case of an emergency  Rest  as much as you can to help you recover  Drink liquids as directed  to help prevent dehydration  Ask how much liquid to drink each day and which liquids are best for you  Prevent the spread of influenza:   · Wash your hands often  Use soap and water  Wash your hands after you use the bathroom, change a child's diapers, or sneeze  Wash your hands before you prepare or eat food  Use gel hand cleanser when soap and water are not available  Do not touch your eyes, nose, or mouth unless you have washed your hands first            · Cover your mouth when you sneeze or cough  Cough into a tissue or the bend of your arm  · Clean shared items with a germ-killing   Clean table surfaces, doorknobs, and light switches  Do not share towels, silverware, and dishes with people who are sick  Wash bed sheets, towels, silverware, and dishes with soap and water  · Wear a mask  over your mouth and nose if you are sick or are near anyone who is sick  · Stay away from others  if you are sick  · Influenza vaccine  helps prevent influenza (flu)  Everyone older than 6 months should get a yearly influenza vaccine  Get the vaccine as soon as it is available, usually in September or October each year  Follow up with your healthcare provider as directed:  Write down your questions so you remember to ask them during your visits  © 2017 [employer] Information is for End User's use only and may not be sold, redistributed or otherwise used for commercial purposes  All illustrations and images included in CareNotes® are the copyrighted property of [employer] M [employer]  or Dhaval Wadsworth  The above information is an  only  It is not intended as medical advice for individual conditions or treatments  Talk to your doctor, nurse or pharmacist before following any medical regimen to see if it is safe and effective for you  Weight Management   AMBULATORY CARE:   Why it is important to manage your weight:  Being overweight increases your risk of health conditions such as heart disease, high blood pressure, type 2 diabetes, and certain types of cancer  It can also increase your risk for osteoarthritis, sleep apnea, and other respiratory problems  Aim for a slow, steady weight loss  Even a small amount of weight loss can lower your risk of health problems  How to lose weight safely:  A safe and healthy way to lose weight is to eat fewer calories and get regular exercise  You can lose up about 1 pound a week by decreasing the number of calories you eat by 500 calories each day  You can decrease calories by eating smaller portion sizes or by cutting out high-calorie foods  Read labels to find out how many calories are in the foods you eat  You can also burn calories with exercise such as walking, swimming, or biking  You will be more likely to keep weight off if you make these changes part of your lifestyle     Healthy meal plan for weight management:  A healthy meal plan includes a variety of foods, contains fewer calories, and helps you stay healthy  A healthy meal plan includes the following:  · Eat whole-grain foods more often  A healthy meal plan should contain fiber  Fiber is the part of grains, fruits, and vegetables that is not broken down by your body  Whole-grain foods are healthy and provide extra fiber in your diet  Some examples of whole-grain foods are whole-wheat breads and pastas, oatmeal, brown rice, and bulgur  · Eat a variety of vegetables every day  Include dark, leafy greens such as spinach, kale, coty greens, and mustard greens  Eat yellow and orange vegetables such as carrots, sweet potatoes, and winter squash  · Eat a variety of fruits every day  Choose fresh or canned fruit (canned in its own juice or light syrup) instead of juice  Fruit juice has very little or no fiber  · Eat low-fat dairy foods  Drink fat-free (skim) milk or 1% milk  Eat fat-free yogurt and low-fat cottage cheese  Try low-fat cheeses such as mozzarella and other reduced-fat cheeses  · Choose meat and other protein foods that are low in fat  Choose beans or other legumes such as split peas or lentils  Choose fish, skinless poultry (chicken or turkey), or lean cuts of red meat (beef or pork)  Before you cook meat or poultry, cut off any visible fat  · Use less fat and oil  Try baking foods instead of frying them  Add less fat, such as margarine, sour cream, regular salad dressing and mayonnaise to foods  Eat fewer high-fat foods  Some examples of high-fat foods include french fries, doughnuts, ice cream, and cakes  · Eat fewer sweets  Limit foods and drinks that are high in sugar  This includes candy, cookies, regular soda, and sweetened drinks  Ways to decrease calories:   · Eat smaller portions  ¨ Use a small plate with smaller servings  ¨ Do not eat second helpings  ¨ When you eat at a restaurant, ask for a box and place half of your meal in the box before you eat      ¨ Share an entrée with someone else     · Replace high-calorie snacks with healthy, low-calorie snacks  ¨ Choose fresh fruit, vegetables, fat-free rice cakes, or air-popped popcorn instead of potato chips, nuts, or chocolate  ¨ Choose water or calorie-free drinks instead of soda or sweetened drinks  · Eat regular meals  Skipping meals can lead to overeating later in the day  Eat a healthy snack in place of a meal if you do not have time to eat a regular meal      · Do not shop for groceries when you are hungry  You may be more likely to make unhealthy food choices  Take a grocery list of healthy foods and shop after you have eaten  Exercise:  Exercise at least 30 minutes per day on most days of the week  Some examples of exercise include walking, biking, dancing, and swimming  You can also fit in more physical activity by taking the stairs instead of the elevator or parking farther away from stores  Ask your healthcare provider about the best exercise plan for you  Other things to consider as you try to lose weight:   · Be aware of situations that may give you the urge to overeat, such as eating while watching television  Find ways to avoid these situations  For example, read a book, go for a walk, or do crafts  · Meet with a weight loss support group or friends who are also trying to lose weight  This may help you stay motivated to continue working on your weight loss goals  © 2017 [employer] Information is for End User's use only and may not be sold, redistributed or otherwise used for commercial purposes  All illustrations and images included in CareNotes® are the copyrighted property of [employer] , [employer]  or Dhaval Wadsworth  The above information is an  only  It is not intended as medical advice for individual conditions or treatments  Talk to your doctor, nurse or pharmacist before following any medical regimen to see if it is safe and effective for you              Chief Complaint Chief Complaint   Patient presents with    flu sxs     fever, headaches, body aches x1day         History of Present Illness       Patient comes in today with the illness over the last 24 hours  Woke up with fevers  Headaches and back pain  Mostly a dry cough  Is concerned that she has had several illness over the last several months  Review of Systems   Review of Systems   Constitutional: Positive for chills and fever  Negative for appetite change and diaphoresis  HENT: Negative for ear pain, rhinorrhea, sinus pressure and sore throat  Eyes: Negative for discharge, redness and itching  Respiratory: Positive for cough  Negative for shortness of breath and wheezing  Cardiovascular: Negative for chest pain and palpitations  Rapid or slow heart rate   Gastrointestinal: Negative for abdominal pain, diarrhea, nausea and vomiting  Musculoskeletal: Positive for back pain           Current Medications       Current Outpatient Medications:     CAMRESE 0 15-0 03 &0 01 MG TABS, Take 1 tablet by mouth daily, Disp: , Rfl: 0    desvenlafaxine (PRISTIQ) 100 mg 24 hr tablet, TAKE 1 TABLET BY MOUTH ONCE DAY , Disp: , Rfl:     LORazepam (ATIVAN) 0 5 mg tablet, Take 1 tablet (0 5 mg total) by mouth every 8 (eight) hours as needed for anxiety, Disp: 60 tablet, Rfl: 1    oseltamivir (TAMIFLU) 75 mg capsule, Take 1 capsule (75 mg total) by mouth every 12 (twelve) hours for 5 days, Disp: 10 capsule, Rfl: 0    triamcinolone (KENALOG) 0 5 % cream, Apply topically 3 (three) times a day, Disp: 60 g, Rfl: 1    Current Allergies     Allergies as of 01/22/2020    (No Known Allergies)            The following portions of the patient's history were reviewed and updated as appropriate: allergies, current medications, past family history, past medical history, past social history, past surgical history and problem list      Past Medical History:   Diagnosis Date    Positive depression screening     resolved 7/18/17       History reviewed  No pertinent surgical history  Family History   Problem Relation Age of Onset    Hypertension Father     Alcohol abuse Father     Alcohol abuse Sister     Depression Sister     Anxiety disorder Sister     Leukemia Maternal Grandmother     Depression Maternal Grandmother     Diabetes Paternal Grandfather          Medications have been verified  Objective   /70   Pulse 95   Temp 98 3 °F (36 8 °C) Comment: took advil x few hours ago  Ht 5' 4" (1 626 m)   Wt 75 1 kg (165 lb 9 6 oz)   SpO2 97%   BMI 28 43 kg/m²        Physical Exam     Physical Exam   Constitutional: She is oriented to person, place, and time  She appears well-developed and well-nourished  No distress  HENT:   Head: Normocephalic and atraumatic  Right Ear: Tympanic membrane and external ear normal  No drainage  Left Ear: Tympanic membrane normal  No drainage  Mouth/Throat: No oropharyngeal exudate  Eyes: Conjunctivae and EOM are normal  Right eye exhibits no discharge  Left eye exhibits no discharge  Neck: Normal range of motion  Neck supple  No thyromegaly present  Cardiovascular: Normal rate, regular rhythm and normal heart sounds  Pulmonary/Chest: Effort normal  No respiratory distress  She has no wheezes  She has no rales  Lymphadenopathy:     She has no cervical adenopathy  Neurological: She is alert and oriented to person, place, and time  Skin: Skin is warm and dry  Psychiatric: She has a normal mood and affect  Her behavior is normal                BMI Counseling: Body mass index is 28 43 kg/m²  The BMI is above normal  Nutrition recommendations include reducing portion sizes, decreasing overall calorie intake and 3-5 servings of fruits/vegetables daily

## 2020-01-22 NOTE — PATIENT INSTRUCTIONS
Start Tamiflu twice daily for 5 days  Over-the-counter medications as discussed for other symptoms  Keep herself well hydrated  I would not return to work until your fevers are down and you're not coughing significantly  Influenza   WHAT YOU NEED TO KNOW:   Influenza (the flu) is an infection caused by the influenza virus  The flu is easily spread when an infected person coughs, sneezes, or has close contact with others  You may be able to spread the flu to others for 1 week or longer after signs or symptoms appear  DISCHARGE INSTRUCTIONS:   Call 911 for any of the following:   · You have trouble breathing, and your lips look purple or blue  · You have a seizure  Return to the emergency department if:   · You are dizzy, or you are urinating less or not at all  · You have a headache with a stiff neck, and you feel tired or confused  · You have new pain or pressure in your chest     · Your symptoms, such as shortness of breath, vomiting, or diarrhea, get worse  · Your symptoms, such as fever and coughing, seem to get better, but then get worse  Contact your healthcare provider if:   · You have new muscle pain or weakness  · You have questions or concerns about your condition or care  Medicines: You may need any of the following:  · Acetaminophen  decreases pain and fever  It is available without a doctor's order  Ask how much to take and how often to take it  Follow directions  Acetaminophen can cause liver damage if not taken correctly  · NSAIDs , such as ibuprofen, help decrease swelling, pain, and fever  This medicine is available with or without a doctor's order  NSAIDs can cause stomach bleeding or kidney problems in certain people  If you take blood thinner medicine, always ask your healthcare provider if NSAIDs are safe for you  Always read the medicine label and follow directions  · Antivirals  help fight a viral infection  · Take your medicine as directed    Contact your healthcare provider if you think your medicine is not helping or if you have side effects  Tell him or her if you are allergic to any medicine  Keep a list of the medicines, vitamins, and herbs you take  Include the amounts, and when and why you take them  Bring the list or the pill bottles to follow-up visits  Carry your medicine list with you in case of an emergency  Rest  as much as you can to help you recover  Drink liquids as directed  to help prevent dehydration  Ask how much liquid to drink each day and which liquids are best for you  Prevent the spread of influenza:   · Wash your hands often  Use soap and water  Wash your hands after you use the bathroom, change a child's diapers, or sneeze  Wash your hands before you prepare or eat food  Use gel hand cleanser when soap and water are not available  Do not touch your eyes, nose, or mouth unless you have washed your hands first            · Cover your mouth when you sneeze or cough  Cough into a tissue or the bend of your arm  · Clean shared items with a germ-killing   Clean table surfaces, doorknobs, and light switches  Do not share towels, silverware, and dishes with people who are sick  Wash bed sheets, towels, silverware, and dishes with soap and water  · Wear a mask  over your mouth and nose if you are sick or are near anyone who is sick  · Stay away from others  if you are sick  · Influenza vaccine  helps prevent influenza (flu)  Everyone older than 6 months should get a yearly influenza vaccine  Get the vaccine as soon as it is available, usually in September or October each year  Follow up with your healthcare provider as directed:  Write down your questions so you remember to ask them during your visits  © 2017 [employer] Information is for End User's use only and may not be sold, redistributed or otherwise used for commercial purposes   All illustrations and images included in CareNotes® are the copyrighted property of [employer]  or Dhaval Wadsworth  The above information is an  only  It is not intended as medical advice for individual conditions or treatments  Talk to your doctor, nurse or pharmacist before following any medical regimen to see if it is safe and effective for you  Weight Management   AMBULATORY CARE:   Why it is important to manage your weight:  Being overweight increases your risk of health conditions such as heart disease, high blood pressure, type 2 diabetes, and certain types of cancer  It can also increase your risk for osteoarthritis, sleep apnea, and other respiratory problems  Aim for a slow, steady weight loss  Even a small amount of weight loss can lower your risk of health problems  How to lose weight safely:  A safe and healthy way to lose weight is to eat fewer calories and get regular exercise  You can lose up about 1 pound a week by decreasing the number of calories you eat by 500 calories each day  You can decrease calories by eating smaller portion sizes or by cutting out high-calorie foods  Read labels to find out how many calories are in the foods you eat  You can also burn calories with exercise such as walking, swimming, or biking  You will be more likely to keep weight off if you make these changes part of your lifestyle  Healthy meal plan for weight management:  A healthy meal plan includes a variety of foods, contains fewer calories, and helps you stay healthy  A healthy meal plan includes the following:  · Eat whole-grain foods more often  A healthy meal plan should contain fiber  Fiber is the part of grains, fruits, and vegetables that is not broken down by your body  Whole-grain foods are healthy and provide extra fiber in your diet  Some examples of whole-grain foods are whole-wheat breads and pastas, oatmeal, brown rice, and bulgur  · Eat a variety of vegetables every day    Include dark, leafy greens such as spinach, kale, coty greens, and mustard greens  Eat yellow and orange vegetables such as carrots, sweet potatoes, and winter squash  · Eat a variety of fruits every day  Choose fresh or canned fruit (canned in its own juice or light syrup) instead of juice  Fruit juice has very little or no fiber  · Eat low-fat dairy foods  Drink fat-free (skim) milk or 1% milk  Eat fat-free yogurt and low-fat cottage cheese  Try low-fat cheeses such as mozzarella and other reduced-fat cheeses  · Choose meat and other protein foods that are low in fat  Choose beans or other legumes such as split peas or lentils  Choose fish, skinless poultry (chicken or turkey), or lean cuts of red meat (beef or pork)  Before you cook meat or poultry, cut off any visible fat  · Use less fat and oil  Try baking foods instead of frying them  Add less fat, such as margarine, sour cream, regular salad dressing and mayonnaise to foods  Eat fewer high-fat foods  Some examples of high-fat foods include french fries, doughnuts, ice cream, and cakes  · Eat fewer sweets  Limit foods and drinks that are high in sugar  This includes candy, cookies, regular soda, and sweetened drinks  Ways to decrease calories:   · Eat smaller portions  ¨ Use a small plate with smaller servings  ¨ Do not eat second helpings  ¨ When you eat at a restaurant, ask for a box and place half of your meal in the box before you eat  ¨ Share an entrée with someone else  · Replace high-calorie snacks with healthy, low-calorie snacks  ¨ Choose fresh fruit, vegetables, fat-free rice cakes, or air-popped popcorn instead of potato chips, nuts, or chocolate  ¨ Choose water or calorie-free drinks instead of soda or sweetened drinks  · Eat regular meals  Skipping meals can lead to overeating later in the day  Eat a healthy snack in place of a meal if you do not have time to eat a regular meal      · Do not shop for groceries when you are hungry    You may be more likely to make unhealthy food choices  Take a grocery list of healthy foods and shop after you have eaten  Exercise:  Exercise at least 30 minutes per day on most days of the week  Some examples of exercise include walking, biking, dancing, and swimming  You can also fit in more physical activity by taking the stairs instead of the elevator or parking farther away from stores  Ask your healthcare provider about the best exercise plan for you  Other things to consider as you try to lose weight:   · Be aware of situations that may give you the urge to overeat, such as eating while watching television  Find ways to avoid these situations  For example, read a book, go for a walk, or do crafts  · Meet with a weight loss support group or friends who are also trying to lose weight  This may help you stay motivated to continue working on your weight loss goals  © 2017 [employer] Information is for End User's use only and may not be sold, redistributed or otherwise used for commercial purposes  All illustrations and images included in CareNotes® are the copyrighted property of [employer]  or Dhaval Wadsworth  The above information is an  only  It is not intended as medical advice for individual conditions or treatments  Talk to your doctor, nurse or pharmacist before following any medical regimen to see if it is safe and effective for you

## 2020-02-27 ENCOUNTER — OFFICE VISIT (OUTPATIENT)
Dept: FAMILY MEDICINE CLINIC | Facility: CLINIC | Age: 35
End: 2020-02-27
Payer: COMMERCIAL

## 2020-02-27 DIAGNOSIS — Z00.00 WELLNESS EXAMINATION: ICD-10-CM

## 2020-02-27 DIAGNOSIS — R53.83 FATIGUE, UNSPECIFIED TYPE: ICD-10-CM

## 2020-02-27 DIAGNOSIS — R11.0 NAUSEA: Primary | ICD-10-CM

## 2020-02-27 PROCEDURE — 99395 PREV VISIT EST AGE 18-39: CPT | Performed by: FAMILY MEDICINE

## 2020-02-27 PROCEDURE — 1036F TOBACCO NON-USER: CPT | Performed by: FAMILY MEDICINE

## 2020-02-27 PROCEDURE — 99213 OFFICE O/P EST LOW 20 MIN: CPT | Performed by: FAMILY MEDICINE

## 2020-02-27 NOTE — PROGRESS NOTES
[facility]        NAME: Deja Peguero is a 30 y o  female  : 1985    MRN: [id number]  DATE: 2020  TIME: 11:06 AM    Assessment and Plan   Nausea [R11 0]  1  Nausea  Celiac Disease Comprehensive Panel    Comprehensive metabolic panel    CBC and differential    TSH, 3rd generation with Free T4 reflex    Food Allergy Profile    Celiac Disease Comprehensive Panel    Comprehensive metabolic panel    CBC and differential    TSH, 3rd generation with Free T4 reflex   2  Fatigue, unspecified type  Celiac Disease Comprehensive Panel    Comprehensive metabolic panel    CBC and differential    TSH, 3rd generation with Free T4 reflex    Food Allergy Profile    Celiac Disease Comprehensive Panel    Comprehensive metabolic panel    CBC and differential    TSH, 3rd generation with Free T4 reflex         Patient Instructions     Patient Instructions   Ck labs          Chief Complaint   No chief complaint on file  History of Present Illness       C/o nausea/freq GI episodes--past 6 mos      Review of Systems   Review of Systems   Constitutional: Negative for appetite change, chills, diaphoresis and fever  HENT: Negative for ear pain, rhinorrhea, sinus pressure and sore throat  Eyes: Negative for discharge, redness and itching  Respiratory: Negative for cough, shortness of breath and wheezing  Cardiovascular: Negative for chest pain and palpitations  Gastrointestinal: Negative for abdominal pain, diarrhea, nausea and vomiting           Current Medications       Current Outpatient Medications:     CAMRESE 0 15-0 03 &0 01 MG TABS, Take 1 tablet by mouth daily, Disp: , Rfl: 0    desvenlafaxine (PRISTIQ) 100 mg 24 hr tablet, TAKE 1 TABLET BY MOUTH ONCE DAY , Disp: , Rfl:     LORazepam (ATIVAN) 0 5 mg tablet, Take 1 tablet (0 5 mg total) by mouth every 8 (eight) hours as needed for anxiety, Disp: 60 tablet, Rfl: 1    triamcinolone (KENALOG) 0 5 % cream, Apply topically 3 (three) times a day, Disp: 60 g, Rfl: 1    Current Allergies     Allergies as of 02/27/2020    (No Known Allergies)            The following portions of the patient's history were reviewed and updated as appropriate: allergies, current medications, past family history, past medical history, past social history, past surgical history and problem list      Past Medical History:   Diagnosis Date    Positive depression screening     resolved 7/18/17       History reviewed  No pertinent surgical history  Family History   Problem Relation Age of Onset    Hypertension Father     Alcohol abuse Father     Alcohol abuse Sister     Depression Sister     Anxiety disorder Sister     Leukemia Maternal Grandmother     Depression Maternal Grandmother     Diabetes Paternal Grandfather          Medications have been verified  Objective   There were no vitals taken for this visit  Physical Exam     Physical Exam   Constitutional: She appears well-developed and well-nourished  No distress  HENT:   Right Ear: Tympanic membrane, external ear and ear canal normal  Tympanic membrane is not injected  Left Ear: Tympanic membrane, external ear and ear canal normal  Tympanic membrane is not injected  Nose: Nose normal    Mouth/Throat: Oropharynx is clear and moist and mucous membranes are normal    Eyes: Pupils are equal, round, and reactive to light  Conjunctivae and EOM are normal  Right eye exhibits no discharge  Left eye exhibits no discharge  Neck: Normal range of motion  Neck supple  No thyromegaly present  Cardiovascular: Normal rate, regular rhythm and normal heart sounds  No murmur heard  Pulmonary/Chest: Effort normal and breath sounds normal  No respiratory distress  She has no wheezes  Lymphadenopathy:     She has no cervical adenopathy  Skin: She is not diaphoretic  Nursing note and vitals reviewed  BMI Counseling: There is no height or weight on file to calculate BMI   The BMI is above normal  Nutrition recommendations include reducing portion sizes

## 2020-03-03 LAB
ALBUMIN SERPL-MCNC: 3.8 G/DL (ref 3.6–5.1)
ALBUMIN/GLOB SERPL: 1.5 (CALC) (ref 1–2.5)
ALMOND IGE QN: <0.1 KU/L
ALP SERPL-CCNC: 82 U/L (ref 31–125)
ALT SERPL-CCNC: 9 U/L (ref 6–29)
AST SERPL-CCNC: 11 U/L (ref 10–30)
BASOPHILS # BLD AUTO: 30 CELLS/UL (ref 0–200)
BASOPHILS NFR BLD AUTO: 0.5 %
BILIRUB SERPL-MCNC: 0.4 MG/DL (ref 0.2–1.2)
BUN SERPL-MCNC: 9 MG/DL (ref 7–25)
BUN/CREAT SERPL: NORMAL (CALC) (ref 6–22)
CALCIUM SERPL-MCNC: 8.6 MG/DL (ref 8.6–10.2)
CASHEW NUT IGE QN: <0.1 KU/L
CHLORIDE SERPL-SCNC: 103 MMOL/L (ref 98–110)
CO2 SERPL-SCNC: 25 MMOL/L (ref 20–32)
CODFISH IGE QN: <0.1 KU/L
CREAT SERPL-MCNC: 0.73 MG/DL (ref 0.5–1.1)
DEPRECATED ALMOND IGE RAST QL: 0
DEPRECATED CASHEW NUT IGE RAST QL: 0
DEPRECATED CODFISH IGE RAST QL: 0
DEPRECATED EGG WHITE IGE RAST QL: 0
DEPRECATED HAZELNUT IGE RAST QL: 0
DEPRECATED MILK IGE RAST QL: 0
DEPRECATED PEANUT IGE RAST QL: 0
DEPRECATED SALMON IGE RAST QL: 0
DEPRECATED SCALLOP IGE RAST QL: 0
DEPRECATED SESAME SEED IGE RAST QL: 0
DEPRECATED SHRIMP IGE RAST QL: 0
DEPRECATED SOYBEAN IGE RAST QL: 0
DEPRECATED TUNA IGE RAST QL: 0
DEPRECATED WALNUT IGE RAST QL: 0
DEPRECATED WHEAT IGE RAST QL: 0
EGG WHITE IGE QN: <0.1 KU/L
EOSINOPHIL # BLD AUTO: 78 CELLS/UL (ref 15–500)
EOSINOPHIL NFR BLD AUTO: 1.3 %
ERYTHROCYTE [DISTWIDTH] IN BLOOD BY AUTOMATED COUNT: 13 % (ref 11–15)
GLOBULIN SER CALC-MCNC: 2.6 G/DL (CALC) (ref 1.9–3.7)
GLUCOSE SERPL-MCNC: 81 MG/DL (ref 65–99)
HAZELNUT IGE QN: <0.1 KU/L
HCT VFR BLD AUTO: 34.5 % (ref 35–45)
HGB BLD-MCNC: 11.7 G/DL (ref 11.7–15.5)
IGA SERPL-MCNC: 149 MG/DL (ref 47–310)
LYMPHOCYTES # BLD AUTO: 1956 CELLS/UL (ref 850–3900)
LYMPHOCYTES NFR BLD AUTO: 32.6 %
MCH RBC QN AUTO: 30.4 PG (ref 27–33)
MCHC RBC AUTO-ENTMCNC: 33.9 G/DL (ref 32–36)
MCV RBC AUTO: 89.6 FL (ref 80–100)
MILK IGE QN: <0.1 KU/L
MONOCYTES # BLD AUTO: 366 CELLS/UL (ref 200–950)
MONOCYTES NFR BLD AUTO: 6.1 %
NEUTROPHILS # BLD AUTO: 3570 CELLS/UL (ref 1500–7800)
NEUTROPHILS NFR BLD AUTO: 59.5 %
PEANUT IGE QN: <0.1 KU/L
PLATELET # BLD AUTO: 316 THOUSAND/UL (ref 140–400)
PMV BLD REES-ECKER: 9.8 FL (ref 7.5–12.5)
POTASSIUM SERPL-SCNC: 4.2 MMOL/L (ref 3.5–5.3)
PROT SERPL-MCNC: 6.4 G/DL (ref 6.1–8.1)
RBC # BLD AUTO: 3.85 MILLION/UL (ref 3.8–5.1)
SALMON IGE QN: <0.1 KU/L
SCALLOP IGE QN: <0.1 KU/L
SESAME SEED IGE QN: <0.1 KU/L
SHRIMP IGE QN: <0.1 KU/L
SL AMB EGFR AFRICAN AMERICAN: 126 ML/MIN/1.73M2
SL AMB EGFR NON AFRICAN AMERICAN: 109 ML/MIN/1.73M2
SODIUM SERPL-SCNC: 136 MMOL/L (ref 135–146)
SOYBEAN IGE QN: <0.1 KU/L
TSH SERPL-ACNC: 2.94 MIU/L
TTG IGA SER-ACNC: 1 U/ML
TUNA IGE QN: <0.1 KU/L
WALNUT IGE QN: <0.1 KU/L
WBC # BLD AUTO: 6 THOUSAND/UL (ref 3.8–10.8)
WHEAT IGE QN: <0.1 KU/L

## 2020-03-05 ENCOUNTER — TELEPHONE (OUTPATIENT)
Dept: FAMILY MEDICINE CLINIC | Facility: CLINIC | Age: 35
End: 2020-03-05

## 2020-03-05 NOTE — TELEPHONE ENCOUNTER
Patient notified as per Dr Jake Fink -- advised a stool softener when taking Iron        ----- Message from Bruce Lee MD sent at 3/4/2020  5:35 AM EST -----  Labs NL----iron sl low---OTC supplement 3x/wk prob a good idea

## 2020-08-03 ENCOUNTER — TELEPHONE (OUTPATIENT)
Dept: FAMILY MEDICINE CLINIC | Facility: CLINIC | Age: 35
End: 2020-08-03

## 2020-08-13 ENCOUNTER — OFFICE VISIT (OUTPATIENT)
Dept: FAMILY MEDICINE CLINIC | Facility: CLINIC | Age: 35
End: 2020-08-13
Payer: COMMERCIAL

## 2020-08-13 VITALS
TEMPERATURE: 98.1 F | OXYGEN SATURATION: 98 % | DIASTOLIC BLOOD PRESSURE: 76 MMHG | HEART RATE: 77 BPM | BODY MASS INDEX: 28.17 KG/M2 | HEIGHT: 64 IN | SYSTOLIC BLOOD PRESSURE: 132 MMHG | WEIGHT: 165 LBS | RESPIRATION RATE: 16 BRPM

## 2020-08-13 DIAGNOSIS — M67.40 GANGLION: Primary | ICD-10-CM

## 2020-08-13 DIAGNOSIS — F41.9 ANXIETY: ICD-10-CM

## 2020-08-13 PROCEDURE — 1036F TOBACCO NON-USER: CPT | Performed by: FAMILY MEDICINE

## 2020-08-13 PROCEDURE — 99214 OFFICE O/P EST MOD 30 MIN: CPT | Performed by: FAMILY MEDICINE

## 2020-08-13 PROCEDURE — 3008F BODY MASS INDEX DOCD: CPT | Performed by: FAMILY MEDICINE

## 2020-08-13 NOTE — PROGRESS NOTES
[facility]        NAME: Shavonne Soriano is a 37 y o  female  : 1985    MRN: [id number]  DATE: 2020  TIME: 10:54 AM    Assessment and Plan   Ganglion [M67 40]  1  Ganglion     2  Anxiety           Patient Instructions     Patient Instructions   Disc internal gangion burst          Chief Complaint     Chief Complaint   Patient presents with    Multiple Concerns     anxiety/nausea/cyst on R hand         History of Present Illness       C/o ganglion cyst--disc options for treatment of anxiety      Review of Systems   Review of Systems   Constitutional: Negative for fatigue, fever and unexpected weight change  HENT: Negative for congestion, sinus pain and sore throat  Eyes: Negative for visual disturbance  Respiratory: Negative for shortness of breath and wheezing  Cardiovascular: Negative for chest pain and palpitations  Gastrointestinal: Negative for abdominal pain, nausea and vomiting  Musculoskeletal: Negative  Negative for arthralgias and myalgias  Neurological: Negative for syncope, weakness and numbness  Psychiatric/Behavioral: Negative for confusion, dysphoric mood and suicidal ideas  The patient is nervous/anxious            Current Medications       Current Outpatient Medications:     desvenlafaxine (PRISTIQ) 100 mg 24 hr tablet, TAKE 1 TABLET BY MOUTH ONCE DAY , Disp: , Rfl:     LORazepam (ATIVAN) 0 5 mg tablet, Take 1 tablet (0 5 mg total) by mouth every 8 (eight) hours as needed for anxiety, Disp: 60 tablet, Rfl: 1    CAMRESE 0 15-0 03 &0 01 MG TABS, Take 1 tablet by mouth daily, Disp: , Rfl: 0    triamcinolone (KENALOG) 0 5 % cream, Apply topically 3 (three) times a day, Disp: 60 g, Rfl: 1    Current Allergies     Allergies as of 2020 - Reviewed 2020   Allergen Reaction Noted    Codeine Itching 2020            The following portions of the patient's history were reviewed and updated as appropriate: allergies, current medications, past family history, past medical history, past social history, past surgical history and problem list      Past Medical History:   Diagnosis Date    Positive depression screening     resolved 7/18/17       No past surgical history on file  Family History   Problem Relation Age of Onset    Hypertension Father     Alcohol abuse Father     Alcohol abuse Sister     Depression Sister     Anxiety disorder Sister     Leukemia Maternal Grandmother     Depression Maternal Grandmother     Diabetes Paternal Grandfather          Medications have been verified  Objective   /76   Pulse 77   Temp 98 1 °F (36 7 °C)   Resp 16   Ht 5' 4" (1 626 m)   Wt 74 8 kg (165 lb)   LMP 08/08/2020 (Exact Date)   SpO2 98%   BMI 28 32 kg/m²        Physical Exam     Physical Exam  Constitutional:       Appearance: She is well-developed  HENT:      Right Ear: Ear canal normal  Tympanic membrane is not injected  Left Ear: Ear canal normal  Tympanic membrane is not injected  Nose: Nose normal    Eyes:      General:         Right eye: No discharge  Left eye: No discharge  Conjunctiva/sclera: Conjunctivae normal       Pupils: Pupils are equal, round, and reactive to light  Neck:      Musculoskeletal: Normal range of motion and neck supple  Thyroid: No thyromegaly  Cardiovascular:      Rate and Rhythm: Normal rate and regular rhythm  Heart sounds: Normal heart sounds  No murmur  Pulmonary:      Effort: Pulmonary effort is normal  No respiratory distress  Breath sounds: Normal breath sounds  No wheezing  Abdominal:      General: Bowel sounds are normal  There is no distension  Palpations: Abdomen is soft  Tenderness: There is no abdominal tenderness  Musculoskeletal: Normal range of motion  Lymphadenopathy:      Cervical: No cervical adenopathy  Skin:     General: Skin is warm and dry     Neurological:      Mental Status: She is alert and oriented to person, place, and time  She is not disoriented  Sensory: No sensory deficit  Gait: Gait normal       Deep Tendon Reflexes: Reflexes are normal and symmetric  Psychiatric:         Speech: Speech normal          Behavior: Behavior normal          Thought Content:  Thought content normal          Judgment: Judgment normal

## 2020-11-02 ENCOUNTER — OFFICE VISIT (OUTPATIENT)
Dept: FAMILY MEDICINE CLINIC | Facility: CLINIC | Age: 35
End: 2020-11-02
Payer: COMMERCIAL

## 2020-11-02 VITALS
WEIGHT: 166.8 LBS | TEMPERATURE: 97.8 F | BODY MASS INDEX: 28.48 KG/M2 | HEIGHT: 64 IN | SYSTOLIC BLOOD PRESSURE: 128 MMHG | HEART RATE: 90 BPM | DIASTOLIC BLOOD PRESSURE: 80 MMHG | OXYGEN SATURATION: 92 %

## 2020-11-02 DIAGNOSIS — F41.9 ANXIETY: Primary | ICD-10-CM

## 2020-11-02 PROCEDURE — 99213 OFFICE O/P EST LOW 20 MIN: CPT | Performed by: FAMILY MEDICINE

## 2020-11-27 ENCOUNTER — OFFICE VISIT (OUTPATIENT)
Dept: FAMILY MEDICINE CLINIC | Facility: CLINIC | Age: 35
End: 2020-11-27
Payer: COMMERCIAL

## 2020-11-27 VITALS
OXYGEN SATURATION: 97 % | BODY MASS INDEX: 29.12 KG/M2 | HEART RATE: 80 BPM | WEIGHT: 174.8 LBS | SYSTOLIC BLOOD PRESSURE: 120 MMHG | DIASTOLIC BLOOD PRESSURE: 84 MMHG | HEIGHT: 65 IN | TEMPERATURE: 98.6 F | RESPIRATION RATE: 20 BRPM

## 2020-11-27 DIAGNOSIS — M67.40 GANGLION: Primary | ICD-10-CM

## 2020-11-27 PROCEDURE — 99213 OFFICE O/P EST LOW 20 MIN: CPT | Performed by: FAMILY MEDICINE

## 2021-02-09 ENCOUNTER — TELEPHONE (OUTPATIENT)
Dept: OBGYN CLINIC | Facility: CLINIC | Age: 36
End: 2021-02-09

## 2021-04-12 DIAGNOSIS — Z23 ENCOUNTER FOR IMMUNIZATION: ICD-10-CM

## 2021-07-08 ENCOUNTER — OFFICE VISIT (OUTPATIENT)
Dept: FAMILY MEDICINE CLINIC | Facility: CLINIC | Age: 36
End: 2021-07-08
Payer: COMMERCIAL

## 2021-07-08 VITALS
HEART RATE: 83 BPM | WEIGHT: 177 LBS | SYSTOLIC BLOOD PRESSURE: 124 MMHG | RESPIRATION RATE: 16 BRPM | HEIGHT: 65 IN | BODY MASS INDEX: 29.49 KG/M2 | OXYGEN SATURATION: 96 % | DIASTOLIC BLOOD PRESSURE: 62 MMHG

## 2021-07-08 DIAGNOSIS — Z00.00 WELLNESS EXAMINATION: ICD-10-CM

## 2021-07-08 DIAGNOSIS — F41.9 ANXIETY: Primary | ICD-10-CM

## 2021-07-08 PROCEDURE — 99395 PREV VISIT EST AGE 18-39: CPT | Performed by: FAMILY MEDICINE

## 2021-07-08 RX ORDER — LORAZEPAM 0.5 MG/1
0.5 TABLET ORAL EVERY 8 HOURS PRN
Qty: 60 TABLET | Refills: 1 | Status: SHIPPED | OUTPATIENT
Start: 2021-07-08

## 2021-07-08 NOTE — PROGRESS NOTES
HPI:  Almas Olivo is a 31 y o  female here for her yearly health maintenance exam    Patient Active Problem List   Diagnosis    Current episode of major depressive disorder without prior episode    Anxiety     Past Medical History:   Diagnosis Date    Anxiety     Depression 2002    Current seeing a psychiatrist    Positive depression screening     resolved 7/18/17       1  Advanced Directive: n     2  Durable Power of  for Healthcare: n     3  Social History:           Drug and alcohol History: n                  4  Immunizations up to date: y                 Lifestyle:                           Healthy Diet:y                          Alcohol Use:y                          Tobacco Use:n                          Regular exercise:y                          Weight concerns:n                               5  Over the past 2 weeks, how often have you been bothered by the following:              Little interest or pleasure in doing things:n              Felling down, depressed or hopeless:n       Current Outpatient Medications   Medication Sig Dispense Refill    Coenzyme Q10 (CO Q10 PO) Take by mouth      Fish Oil-Cholecalciferol (OMEGA-3 + VITAMIN D3 PO) Take by mouth      LORazepam (ATIVAN) 0 5 mg tablet Take 1 tablet (0 5 mg total) by mouth every 8 (eight) hours as needed for anxiety 60 tablet 1    Naltrexone (Vivitrol) 380 MG SUSR Inject 4 5 mg into a muscle daily      Prenatal Vit-DSS-Fe Cbn-FA (PRENATAL AD PO) Take by mouth       No current facility-administered medications for this visit  Allergies   Allergen Reactions    Codeine Itching     Immunization History   Administered Date(s) Administered    Tdap 04/19/2012       Patient Care Team:  Emely Mena MD as PCP - General    Review of Systems   Constitutional: Negative for fatigue, fever and unexpected weight change  HENT: Negative for congestion, sinus pain and sore throat  Eyes: Negative for visual disturbance     Respiratory: Negative for shortness of breath and wheezing  Cardiovascular: Negative for chest pain and palpitations  Gastrointestinal: Negative for abdominal pain, nausea and vomiting  Musculoskeletal: Negative  Negative for arthralgias and myalgias  Neurological: Negative for syncope, weakness and numbness  Psychiatric/Behavioral: Negative  Negative for confusion, dysphoric mood and suicidal ideas  Physical Exam :  Physical Exam  Constitutional:       Appearance: She is well-developed  HENT:      Right Ear: Ear canal normal  Tympanic membrane is not injected  Left Ear: Ear canal normal  Tympanic membrane is not injected  Nose: Nose normal    Eyes:      General:         Right eye: No discharge  Left eye: No discharge  Conjunctiva/sclera: Conjunctivae normal       Pupils: Pupils are equal, round, and reactive to light  Neck:      Thyroid: No thyromegaly  Cardiovascular:      Rate and Rhythm: Normal rate and regular rhythm  Heart sounds: Normal heart sounds  No murmur heard  Pulmonary:      Effort: Pulmonary effort is normal  No respiratory distress  Breath sounds: Normal breath sounds  No wheezing  Abdominal:      General: Bowel sounds are normal  There is no distension  Palpations: Abdomen is soft  Tenderness: There is no abdominal tenderness  Musculoskeletal:         General: Normal range of motion  Cervical back: Normal range of motion and neck supple  Lymphadenopathy:      Cervical: No cervical adenopathy  Skin:     General: Skin is warm and dry  Neurological:      Mental Status: She is alert and oriented to person, place, and time  She is not disoriented  Sensory: No sensory deficit  Gait: Gait normal       Deep Tendon Reflexes: Reflexes are normal and symmetric  Psychiatric:         Speech: Speech normal          Behavior: Behavior normal          Thought Content:  Thought content normal          Judgment: Judgment normal  Assessment and Plan:  1  Anxiety  LORazepam (ATIVAN) 0 5 mg tablet   2   Wellness examination         Health Maintenance Due   Topic Date Due    Hepatitis C Screening  Never done    COVID-19 Vaccine (1) Never done    HIV Screening  Never done    Cervical Cancer Screening  Never done    BMI: Followup Plan  02/27/2021    Influenza Vaccine (1) 09/01/2021

## 2021-12-29 ENCOUNTER — CLINICAL SUPPORT (OUTPATIENT)
Dept: FAMILY MEDICINE CLINIC | Facility: CLINIC | Age: 36
End: 2021-12-29

## 2021-12-29 DIAGNOSIS — Z20.822 SUSPECTED COVID-19 VIRUS INFECTION: Primary | ICD-10-CM

## 2021-12-29 PROCEDURE — U0003 INFECTIOUS AGENT DETECTION BY NUCLEIC ACID (DNA OR RNA); SEVERE ACUTE RESPIRATORY SYNDROME CORONAVIRUS 2 (SARS-COV-2) (CORONAVIRUS DISEASE [COVID-19]), AMPLIFIED PROBE TECHNIQUE, MAKING USE OF HIGH THROUGHPUT TECHNOLOGIES AS DESCRIBED BY CMS-2020-01-R: HCPCS | Performed by: FAMILY MEDICINE

## 2021-12-29 PROCEDURE — U0005 INFEC AGEN DETEC AMPLI PROBE: HCPCS | Performed by: FAMILY MEDICINE

## 2021-12-31 LAB — SARS-COV-2 RNA RESP QL NAA+PROBE: NEGATIVE

## 2022-06-28 ENCOUNTER — TELEPHONE (OUTPATIENT)
Dept: FAMILY MEDICINE CLINIC | Facility: CLINIC | Age: 37
End: 2022-06-28

## 2022-06-28 NOTE — TELEPHONE ENCOUNTER
She like to know all the details and instructions      Why pt had the card? How many mg? How many time a day she had to use  It?       Please advised

## 2022-06-28 NOTE — TELEPHONE ENCOUNTER
catalina from [facility]  [phone]  States that need the information about pt medical mariguana card, to verify  pt sates that she seen Noris Wick     Can you on your data base if this pt had the medical marijuana card?     Pleases advised

## 2022-07-25 ENCOUNTER — OFFICE VISIT (OUTPATIENT)
Dept: FAMILY MEDICINE CLINIC | Facility: CLINIC | Age: 37
End: 2022-07-25
Payer: COMMERCIAL

## 2022-07-25 VITALS
SYSTOLIC BLOOD PRESSURE: 126 MMHG | HEIGHT: 64 IN | DIASTOLIC BLOOD PRESSURE: 78 MMHG | HEART RATE: 97 BPM | WEIGHT: 171 LBS | OXYGEN SATURATION: 98 % | RESPIRATION RATE: 16 BRPM | BODY MASS INDEX: 29.19 KG/M2

## 2022-07-25 DIAGNOSIS — F41.9 ANXIETY: ICD-10-CM

## 2022-07-25 DIAGNOSIS — F32.0 CURRENT MILD EPISODE OF MAJOR DEPRESSIVE DISORDER WITHOUT PRIOR EPISODE (HCC): ICD-10-CM

## 2022-07-25 DIAGNOSIS — Z00.00 WELLNESS EXAMINATION: Primary | ICD-10-CM

## 2022-07-25 PROCEDURE — 99395 PREV VISIT EST AGE 18-39: CPT | Performed by: FAMILY MEDICINE

## 2022-07-25 PROCEDURE — 99214 OFFICE O/P EST MOD 30 MIN: CPT | Performed by: FAMILY MEDICINE

## 2022-07-25 RX ORDER — DESVENLAFAXINE 100 MG/1
100 TABLET, EXTENDED RELEASE ORAL DAILY
COMMUNITY
Start: 2022-07-14

## 2022-07-25 NOTE — PROGRESS NOTES
HPI:  Berta Luis is a 30 y o  female here for her yearly health maintenance exam    Patient Active Problem List   Diagnosis    Current episode of major depressive disorder without prior episode    Anxiety     Past Medical History:   Diagnosis Date    Anxiety     Depression 2002    Current seeing a psychiatrist    Positive depression screening     resolved 7/18/17       1  Advanced Directive: n     2  Durable Power of  for Healthcare: n     3  Social History:           Drug and alcohol History: n                  4  Immunizations up to date: y                 Lifestyle:                           Healthy Diet:y                          Alcohol Use:n                          Tobacco Use:n                          Regular exercise:y                          Weight concerns:n                               5  Over the past 2 weeks, how often have you been bothered by the following:              Little interest or pleasure in doing things:n              Felling down, depressed or hopeless:n       Current Outpatient Medications   Medication Sig Dispense Refill    desvenlafaxine (PRISTIQ) 100 mg 24 hr tablet Take 100 mg by mouth daily      LORazepam (ATIVAN) 0 5 mg tablet Take 1 tablet (0 5 mg total) by mouth every 8 (eight) hours as needed for anxiety 60 tablet 1    Prenatal Vit-DSS-Fe Cbn-FA (PRENATAL AD PO) Take by mouth      Naltrexone (Vivitrol) 380 MG SUSR Inject 4 5 mg into a muscle daily       No current facility-administered medications for this visit  Allergies   Allergen Reactions    Codeine Itching     Immunization History   Administered Date(s) Administered    Tdap 04/19/2012       Patient Care Team:  Cain Drake MD as PCP - General    Review of Systems   Constitutional: Negative for fatigue, fever and unexpected weight change  HENT: Negative for congestion, sinus pain and sore throat  Eyes: Negative for visual disturbance     Respiratory: Negative for shortness of breath and wheezing  Cardiovascular: Negative for chest pain and palpitations  Gastrointestinal: Negative for abdominal pain, nausea and vomiting  Musculoskeletal: Negative  Negative for arthralgias and myalgias  Neurological: Negative for syncope, weakness and numbness  Psychiatric/Behavioral: Negative  Negative for confusion, dysphoric mood and suicidal ideas  Physical Exam :  Physical Exam  Constitutional:       Appearance: She is well-developed  HENT:      Right Ear: Ear canal normal  Tympanic membrane is not injected  Left Ear: Ear canal normal  Tympanic membrane is not injected  Nose: Nose normal    Eyes:      General:         Right eye: No discharge  Left eye: No discharge  Conjunctiva/sclera: Conjunctivae normal       Pupils: Pupils are equal, round, and reactive to light  Neck:      Thyroid: No thyromegaly  Cardiovascular:      Rate and Rhythm: Normal rate and regular rhythm  Heart sounds: Normal heart sounds  No murmur heard  Pulmonary:      Effort: Pulmonary effort is normal  No respiratory distress  Breath sounds: Normal breath sounds  No wheezing  Abdominal:      General: Bowel sounds are normal  There is no distension  Palpations: Abdomen is soft  Tenderness: There is no abdominal tenderness  Musculoskeletal:         General: Normal range of motion  Cervical back: Normal range of motion and neck supple  Lymphadenopathy:      Cervical: No cervical adenopathy  Skin:     General: Skin is warm and dry  Neurological:      Mental Status: She is alert and oriented to person, place, and time  She is not disoriented  Sensory: No sensory deficit  Gait: Gait normal       Deep Tendon Reflexes: Reflexes are normal and symmetric  Psychiatric:         Speech: Speech normal          Behavior: Behavior normal          Thought Content: Thought content normal          Judgment: Judgment normal            Assessment and Plan:  1   Wellness examination     2   Current mild episode of major depressive disorder without prior episode [facility])         Health Maintenance Due   Topic Date Due    Hepatitis C Screening  Never done    COVID-19 Vaccine (1) Never done    HIV Screening  Never done    Cervical Cancer Screening  Never done    BMI: Followup Plan  02/27/2021    DTaP,Tdap,and Td Vaccines (2 - Td or Tdap) 04/19/2022    Influenza Vaccine (1) 09/01/2022

## 2022-07-25 NOTE — PROGRESS NOTES
Assessment/Plan:    No problem-specific Assessment & Plan notes found for this encounter  Diagnoses and all orders for this visit:    Wellness examination    Current mild episode of major depressive disorder without prior episode ([address] )    Anxiety    Other orders  -     desvenlafaxine (PRISTIQ) 100 mg 24 hr tablet; Take 100 mg by mouth daily          Subjective:   No chief complaint on file  Patient ID: Sebastián Rodarte is a 30 y o  female  HPI    The following portions of the patient's history were reviewed and updated as appropriate: allergies, current medications, past family history, past medical history, past social history, past surgical history and problem list     Review of Systems   Constitutional: Negative for appetite change, chills, diaphoresis and fever  HENT: Negative for ear pain, rhinorrhea, sinus pressure and sore throat  Eyes: Negative for discharge, redness and itching  Respiratory: Negative for cough, shortness of breath and wheezing  Cardiovascular: Negative for chest pain and palpitations  Gastrointestinal: Negative for abdominal pain, diarrhea, nausea and vomiting  Objective:  Vitals:    07/25/22 0953   BP: 126/78   Pulse: 97   Resp: 16   SpO2: 98%   Weight: 77 6 kg (171 lb)   Height: 5' 4" (1 626 m)      Physical Exam  Vitals and nursing note reviewed  Constitutional:       General: She is not in acute distress  Appearance: She is well-developed  She is not diaphoretic  HENT:      Right Ear: Tympanic membrane, ear canal and external ear normal  Tympanic membrane is not injected  Left Ear: Tympanic membrane, ear canal and external ear normal  Tympanic membrane is not injected  Nose: Nose normal    Eyes:      General:         Right eye: No discharge  Left eye: No discharge  Conjunctiva/sclera: Conjunctivae normal       Pupils: Pupils are equal, round, and reactive to light  Neck:      Thyroid: No thyromegaly     Cardiovascular: Rate and Rhythm: Normal rate and regular rhythm  Heart sounds: Normal heart sounds  No murmur heard  Pulmonary:      Effort: Pulmonary effort is normal  No respiratory distress  Breath sounds: Normal breath sounds  No wheezing  Musculoskeletal:      Cervical back: Normal range of motion and neck supple  Lymphadenopathy:      Cervical: No cervical adenopathy

## 2022-10-20 ENCOUNTER — OFFICE VISIT (OUTPATIENT)
Dept: FAMILY MEDICINE CLINIC | Facility: CLINIC | Age: 37
End: 2022-10-20
Payer: COMMERCIAL

## 2022-10-20 VITALS
OXYGEN SATURATION: 96 % | BODY MASS INDEX: 30 KG/M2 | DIASTOLIC BLOOD PRESSURE: 88 MMHG | HEART RATE: 78 BPM | WEIGHT: 174.8 LBS | SYSTOLIC BLOOD PRESSURE: 134 MMHG

## 2022-10-20 DIAGNOSIS — J01.00 SUBACUTE MAXILLARY SINUSITIS: Primary | ICD-10-CM

## 2022-10-20 PROCEDURE — 99213 OFFICE O/P EST LOW 20 MIN: CPT | Performed by: FAMILY MEDICINE

## 2022-10-20 RX ORDER — AZITHROMYCIN 250 MG/1
TABLET, FILM COATED ORAL
Qty: 6 TABLET | Refills: 0 | Status: SHIPPED | OUTPATIENT
Start: 2022-10-20 | End: 2022-10-25

## 2022-10-20 NOTE — PROGRESS NOTES
[id number]         NAME: Ajit Dickson is a 30 y o  female  : 1985    MRN: [id number]  DATE: 2022  TIME: 9:11 AM    Assessment and Plan   Subacute maxillary sinusitis [J01 00]  1  Subacute maxillary sinusitis  azithromycin (Zithromax) 250 mg tablet       No problem-specific Assessment & Plan notes found for this encounter  Patient Instructions     Patient Instructions   Continue to monitor blood pressure  OTC meds for symptoms  Dhruv max x5 days  Chief Complaint     Chief Complaint   Patient presents with   • Cough     1 5 weeks -- productive -- clear   • Earache     Right ear worse than Left -- started about 2 days          History of Present Illness       Patient has nasal congestion over the last 5-6 days  COVID testing home negative x2  Review of Systems   Review of Systems   Constitutional: Negative for appetite change, chills, diaphoresis and fever  HENT: Positive for congestion and sinus pressure  Negative for ear pain, rhinorrhea and sore throat  Eyes: Negative for discharge, redness and itching  Respiratory: Positive for cough  Negative for shortness of breath and wheezing  Cardiovascular: Negative for chest pain and palpitations  Rapid or slow heart rate   Gastrointestinal: Negative for diarrhea, nausea and vomiting  Current Medications       Current Outpatient Medications:   •  azithromycin (Zithromax) 250 mg tablet, Take 2 tablets (500 mg total) by mouth daily for 1 day, THEN 1 tablet (250 mg total) daily for 4 days  , Disp: 6 tablet, Rfl: 0  •  desvenlafaxine (PRISTIQ) 100 mg 24 hr tablet, Take 100 mg by mouth daily, Disp: , Rfl:   •  LORazepam (ATIVAN) 0 5 mg tablet, Take 1 tablet (0 5 mg total) by mouth every 8 (eight) hours as needed for anxiety, Disp: 60 tablet, Rfl: 1    Current Allergies     Allergies as of 10/20/2022 - Reviewed 10/20/2022   Allergen Reaction Noted   • Codeine Itching 2020 • Levaquin [levofloxacin] Myalgia 10/20/2022            The following portions of the patient's history were reviewed and updated as appropriate: allergies, current medications, past family history, past medical history, past social history, past surgical history and problem list      Past Medical History:   Diagnosis Date   • Anxiety    • Depression     Current seeing a psychiatrist   • Positive depression screening     resolved 17   • Preeclampsia, third trimester 2022    Delivered 1 month early -- took BP meds for 6 weeks after delivery       Past Surgical History:   Procedure Laterality Date   • BREAST SURGERY Bilateral    •  SECTION  2022   • REFRACTIVE SURGERY     • TONSILLECTOMY         Family History   Problem Relation Age of Onset   • Hypertension Father    • Alcohol abuse Father    • Alcohol abuse Sister    • Depression Sister    • Anxiety disorder Sister    • Leukemia Maternal Grandmother    • Depression Maternal Grandmother    • Suicide Attempts Maternal Grandmother    • Diabetes Paternal Grandfather    • Stroke Paternal Grandfather    • Depression Cousin    • Anxiety disorder Cousin    • Self-Injury Cousin    • Suicide Attempts Cousin    • Stroke Paternal Grandmother          Medications have been verified  Objective   /88   Pulse 78   Wt 79 3 kg (174 lb 12 8 oz)   SpO2 96%   BMI 30 00 kg/m²        Physical Exam     Physical Exam  Vitals reviewed  Constitutional:       General: She is not in acute distress  Appearance: She is well-developed  She is not ill-appearing  HENT:      Head: Normocephalic and atraumatic  Right Ear: Tympanic membrane and external ear normal  No drainage  Left Ear: Tympanic membrane normal  No drainage  Nose: Congestion present  Mouth/Throat:      Pharynx: No oropharyngeal exudate or posterior oropharyngeal erythema  Eyes:      General:         Right eye: No discharge  Left eye: No discharge  Extraocular Movements: Extraocular movements intact  Conjunctiva/sclera: Conjunctivae normal       Pupils: Pupils are equal, round, and reactive to light  Neck:      Thyroid: No thyromegaly  Cardiovascular:      Rate and Rhythm: Normal rate and regular rhythm  Heart sounds: Normal heart sounds  Pulmonary:      Effort: Pulmonary effort is normal  No respiratory distress  Breath sounds: Rhonchi present  No wheezing or rales  Musculoskeletal:      Cervical back: Normal range of motion and neck supple  Lymphadenopathy:      Cervical: No cervical adenopathy  Skin:     Findings: No rash  Neurological:      Mental Status: She is alert

## 2022-11-01 ENCOUNTER — OFFICE VISIT (OUTPATIENT)
Dept: FAMILY MEDICINE CLINIC | Facility: CLINIC | Age: 37
End: 2022-11-01

## 2022-11-01 VITALS
SYSTOLIC BLOOD PRESSURE: 132 MMHG | OXYGEN SATURATION: 97 % | TEMPERATURE: 98 F | DIASTOLIC BLOOD PRESSURE: 84 MMHG | WEIGHT: 178 LBS | HEIGHT: 64 IN | BODY MASS INDEX: 30.39 KG/M2 | HEART RATE: 70 BPM

## 2022-11-01 DIAGNOSIS — R10.11 RIGHT UPPER QUADRANT ABDOMINAL PAIN: ICD-10-CM

## 2022-11-01 DIAGNOSIS — R11.0 NAUSEA: Primary | ICD-10-CM

## 2022-11-01 RX ORDER — ONDANSETRON 4 MG/1
4 TABLET, FILM COATED ORAL EVERY 8 HOURS PRN
Qty: 30 TABLET | Refills: 5 | Status: SHIPPED | OUTPATIENT
Start: 2022-11-01

## 2022-11-01 RX ORDER — OMEPRAZOLE 20 MG/1
20 CAPSULE, DELAYED RELEASE ORAL DAILY
Qty: 90 CAPSULE | Refills: 2 | Status: SHIPPED | OUTPATIENT
Start: 2022-11-01

## 2022-11-01 NOTE — PROGRESS NOTES
Assessment/Plan:    No problem-specific Assessment & Plan notes found for this encounter  Diagnoses and all orders for this visit:    Nausea  -     omeprazole (PriLOSEC) 20 mg delayed release capsule; Take 1 capsule (20 mg total) by mouth daily  -     ondansetron (ZOFRAN) 4 mg tablet; Take 1 tablet (4 mg total) by mouth every 8 (eight) hours as needed for nausea or vomiting    Right upper quadrant abdominal pain  -     CBC and differential; Future  -     Comprehensive metabolic panel; Future  -     C-reactive protein; Future  -     Gamma GT; Future  -     H  pylori antibody, IgG; Future  -     Lipase; Future  -     US right upper quadrant; Future          Subjective:   Chief Complaint   Patient presents with   • Nausea        Patient ID: Merlinda Hurdle is a 30 y o  female  HPI    The following portions of the patient's history were reviewed and updated as appropriate: allergies, current medications, past family history, past medical history, past social history, past surgical history and problem list     Review of Systems   Constitutional: Negative for fatigue, fever and unexpected weight change  HENT: Negative for congestion, sinus pain and sore throat  Eyes: Negative for visual disturbance  Respiratory: Negative for shortness of breath and wheezing  Cardiovascular: Negative for chest pain and palpitations  Gastrointestinal: Negative for abdominal pain, nausea and vomiting  Musculoskeletal: Negative  Negative for arthralgias and myalgias  Neurological: Negative for syncope, weakness and numbness  Psychiatric/Behavioral: Negative  Negative for confusion, dysphoric mood and suicidal ideas           Objective:  Vitals:    11/01/22 1027   BP: 132/84   BP Location: Left arm   Patient Position: Sitting   Cuff Size: Standard   Pulse: 70   Temp: 98 °F (36 7 °C)   TempSrc: Tympanic   SpO2: 97%   Weight: 80 7 kg (178 lb)   Height: 5' 4" (1 626 m)      Physical Exam  Constitutional: Appearance: She is well-developed  HENT:      Right Ear: Ear canal normal  Tympanic membrane is not injected  Left Ear: Ear canal normal  Tympanic membrane is not injected  Nose: Nose normal    Eyes:      General:         Right eye: No discharge  Left eye: No discharge  Conjunctiva/sclera: Conjunctivae normal       Pupils: Pupils are equal, round, and reactive to light  Neck:      Thyroid: No thyromegaly  Cardiovascular:      Rate and Rhythm: Normal rate and regular rhythm  Heart sounds: Normal heart sounds  No murmur heard  Pulmonary:      Effort: Pulmonary effort is normal  No respiratory distress  Breath sounds: Normal breath sounds  No wheezing  Abdominal:      General: Bowel sounds are normal  There is no distension  Palpations: Abdomen is soft  Tenderness: There is no abdominal tenderness  Musculoskeletal:         General: Normal range of motion  Cervical back: Normal range of motion and neck supple  Lymphadenopathy:      Cervical: No cervical adenopathy  Skin:     General: Skin is warm and dry  Neurological:      Mental Status: She is alert and oriented to person, place, and time  She is not disoriented  Sensory: No sensory deficit  Gait: Gait normal       Deep Tendon Reflexes: Reflexes are normal and symmetric  Psychiatric:         Speech: Speech normal          Behavior: Behavior normal          Thought Content:  Thought content normal          Judgment: Judgment normal

## 2022-11-03 LAB
ALBUMIN SERPL-MCNC: 4.1 G/DL (ref 3.8–4.8)
ALBUMIN/GLOB SERPL: 2 {RATIO} (ref 1.2–2.2)
ALP SERPL-CCNC: 101 IU/L (ref 44–121)
ALT SERPL-CCNC: 14 IU/L (ref 0–32)
AST SERPL-CCNC: 14 IU/L (ref 0–40)
BASOPHILS # BLD AUTO: 0 X10E3/UL (ref 0–0.2)
BASOPHILS NFR BLD AUTO: 1 %
BILIRUB SERPL-MCNC: 0.2 MG/DL (ref 0–1.2)
BUN SERPL-MCNC: 12 MG/DL (ref 6–20)
BUN/CREAT SERPL: 17 (ref 9–23)
CALCIUM SERPL-MCNC: 9.1 MG/DL (ref 8.7–10.2)
CHLORIDE SERPL-SCNC: 103 MMOL/L (ref 96–106)
CO2 SERPL-SCNC: 24 MMOL/L (ref 20–29)
CREAT SERPL-MCNC: 0.69 MG/DL (ref 0.57–1)
CRP SERPL-MCNC: 9 MG/L (ref 0–10)
EGFR: 116 ML/MIN/1.73
ENDOMYSIUM IGA SER QL: NEGATIVE
EOSINOPHIL # BLD AUTO: 0.1 X10E3/UL (ref 0–0.4)
EOSINOPHIL NFR BLD AUTO: 2 %
ERYTHROCYTE [DISTWIDTH] IN BLOOD BY AUTOMATED COUNT: 13.6 % (ref 11.7–15.4)
GGT SERPL-CCNC: 14 IU/L (ref 0–60)
GLIADIN PEPTIDE IGA SER-ACNC: 3 UNITS (ref 0–19)
GLIADIN PEPTIDE IGG SER-ACNC: 2 UNITS (ref 0–19)
GLOBULIN SER-MCNC: 2.1 G/DL (ref 1.5–4.5)
GLUCOSE SERPL-MCNC: 89 MG/DL (ref 70–99)
H PYLORI IGG SER IA-ACNC: 0.12 INDEX VALUE (ref 0–0.79)
HCT VFR BLD AUTO: 34.5 % (ref 34–46.6)
HGB BLD-MCNC: 11.3 G/DL (ref 11.1–15.9)
IGA SERPL-MCNC: 157 MG/DL (ref 87–352)
IMM GRANULOCYTES # BLD: 0 X10E3/UL (ref 0–0.1)
IMM GRANULOCYTES NFR BLD: 0 %
LIPASE SERPL-CCNC: 26 U/L (ref 14–72)
LYMPHOCYTES # BLD AUTO: 1.5 X10E3/UL (ref 0.7–3.1)
LYMPHOCYTES NFR BLD AUTO: 29 %
MCH RBC QN AUTO: 28.3 PG (ref 26.6–33)
MCHC RBC AUTO-ENTMCNC: 32.8 G/DL (ref 31.5–35.7)
MCV RBC AUTO: 87 FL (ref 79–97)
MONOCYTES # BLD AUTO: 0.4 X10E3/UL (ref 0.1–0.9)
MONOCYTES NFR BLD AUTO: 7 %
NEUTROPHILS # BLD AUTO: 3.1 X10E3/UL (ref 1.4–7)
NEUTROPHILS NFR BLD AUTO: 61 %
PLATELET # BLD AUTO: 285 X10E3/UL (ref 150–450)
POTASSIUM SERPL-SCNC: 4.4 MMOL/L (ref 3.5–5.2)
PROT SERPL-MCNC: 6.2 G/DL (ref 6–8.5)
RBC # BLD AUTO: 3.99 X10E6/UL (ref 3.77–5.28)
SODIUM SERPL-SCNC: 139 MMOL/L (ref 134–144)
TTG IGA SER-ACNC: <2 U/ML (ref 0–3)
TTG IGG SER-ACNC: <2 U/ML (ref 0–5)
WBC # BLD AUTO: 5 X10E3/UL (ref 3.4–10.8)

## 2022-11-07 ENCOUNTER — TELEPHONE (OUTPATIENT)
Dept: FAMILY MEDICINE CLINIC | Facility: CLINIC | Age: 37
End: 2022-11-07

## 2022-11-07 NOTE — TELEPHONE ENCOUNTER
1st attempt to contact pt -   11/7/2022 @ 5:30 PM    I left a VM advising pt to call the office for her results and advice       Advise pt to f/u with GI

## 2022-12-15 ENCOUNTER — TELEPHONE (OUTPATIENT)
Dept: GASTROENTEROLOGY | Facility: CLINIC | Age: 37
End: 2022-12-15

## 2022-12-15 NOTE — TELEPHONE ENCOUNTER
Called pt in regards to referral sent over by PCP's office  I lvm for call back if they still need an appointment

## 2023-01-06 ENCOUNTER — CONSULT (OUTPATIENT)
Dept: GASTROENTEROLOGY | Facility: CLINIC | Age: 38
End: 2023-01-06

## 2023-01-06 VITALS
DIASTOLIC BLOOD PRESSURE: 80 MMHG | HEIGHT: 64 IN | SYSTOLIC BLOOD PRESSURE: 122 MMHG | BODY MASS INDEX: 30.63 KG/M2 | TEMPERATURE: 98.8 F | WEIGHT: 179.4 LBS

## 2023-01-06 DIAGNOSIS — R14.0 BLOATING: ICD-10-CM

## 2023-01-06 DIAGNOSIS — R11.0 CHRONIC NAUSEA: Primary | ICD-10-CM

## 2023-01-06 DIAGNOSIS — Z00.00 PREVENTATIVE HEALTH CARE: ICD-10-CM

## 2023-01-06 DIAGNOSIS — R10.11 RIGHT UPPER QUADRANT ABDOMINAL PAIN: ICD-10-CM

## 2023-01-06 RX ORDER — NORETHINDRONE ACETATE AND ETHINYL ESTRADIOL 1MG-20(21)
1 KIT ORAL DAILY
COMMUNITY
Start: 2022-12-02

## 2023-01-06 RX ORDER — NORGESTIMATE AND ETHINYL ESTRADIOL 0.25-0.035
KIT ORAL
COMMUNITY
Start: 2022-12-25

## 2023-01-06 RX ORDER — ELAGOLIX 150 MG/1
TABLET, FILM COATED ORAL EVERY 24 HOURS
COMMUNITY

## 2023-01-06 RX ORDER — CLOBETASOL PROPIONATE 0.5 MG/G
1 OINTMENT TOPICAL EVERY 12 HOURS
COMMUNITY

## 2023-01-06 RX ORDER — LEVOTHYROXINE SODIUM 0.03 MG/1
TABLET ORAL EVERY 24 HOURS
COMMUNITY

## 2023-01-06 RX ORDER — NORETHINDRONE ACETATE AND ETHINYL ESTRADIOL 1MG-20(21)
KIT ORAL EVERY 24 HOURS
COMMUNITY
Start: 2022-12-02

## 2023-01-06 NOTE — PATIENT INSTRUCTIONS
Scheduled date of EGD(as of today):02 13 23  Physician performing EGD:DR HOLMAN  [facility] of [facility]GD[facility]  Instructions reviewed with patient by:CHERRY  Clearances:  N/A

## 2023-01-06 NOTE — PROGRESS NOTES
[facility] - Outpatient Consultation  [facility] 30 y o  female MRN: [id number]  Encounter: [id number]          ASSESSMENT AND PLAN:      1   Nausea  2  Bloating    Differentials include H  pylori infection, peptic ulcer disease, gastroparesis, marijuana use, small intestinal bacterial overgrowth, functional dyspepsia  Symptoms have worsened recently  She has been using Pristiq and marijuana after her symptoms started many years ago  We will investigate with EGD  Continue current medications  3   Preventative health care  Get hepatitis C antibody  RTC 4 months  Mely Perrin MD  Gastroenterology  [place] [id number]  Date: January 6, 2023       - EGD; Future  - Hepatitis C antibody; Future        ______________________________________________________________________    HPI: 61-year-old with depression/anxiety, hypothyroidism presents for evaluation  Patient states she has had nausea occasionally for years  Usually occurs 2 times per week  No relation to meal intake  Symptoms have been increasingly frequent recently  No heartburn, swallowing problems or abdominal pain  Normal bowel movements  No abdominal pain in the past   No blood in stools  No weight loss or early satiety  No family history of stomach or colon cancer  She drinks 5-6 alcoholic beverages per week  She has smoked marijuana for many years for treatment of nausea  Marijuana improves her nausea sometimes  No cigarette smoking  Occasional Advil intake for headaches  She had EGD many years ago  She had gastric emptying study in 2016 which was normal   She had nausea when the last gastric emptying study was performed  Labs done in November 2022 show normal CBC, CMP, celiac serology, H  pylori antibody and CRP  REVIEW OF SYSTEMS:    CONSTITUTIONAL: Denies any fever, chills, rigors, and weight loss  HEENT: No earache or tinnitus   Denies hearing loss or visual disturbances  CARDIOVASCULAR: No chest pain or palpitations  RESPIRATORY: Denies any cough, hemoptysis, shortness of breath or dyspnea on exertion  GASTROINTESTINAL: As noted in the History of Present Illness  GENITOURINARY: No problems with urination  Denies any hematuria or dysuria  NEUROLOGIC: No dizziness or vertigo, denies headaches  MUSCULOSKELETAL: Denies any muscle or joint pain  SKIN: Denies skin rashes or itching  ENDOCRINE: Denies excessive thirst  Denies intolerance to heat or cold  PSYCHOSOCIAL: Denies depression or anxiety  Denies any recent memory loss         Historical Information   Past Medical History:   Diagnosis Date   • Anxiety    • Depression     Current seeing a psychiatrist   • Positive depression screening     resolved 19   • Preeclampsia, third trimester 2022    Delivered 1 month early -- took BP meds for 6 weeks after delivery     Past Surgical History:   Procedure Laterality Date   • BREAST SURGERY Bilateral    •  SECTION  2022   • REFRACTIVE SURGERY     • TONSILLECTOMY     • UPPER GASTROINTESTINAL ENDOSCOPY       Social History   Social History     Substance and Sexual Activity   Alcohol Use Yes   • Alcohol/week: 2 0 - 4 0 standard drinks   • Types: 1 - 2 Glasses of wine, 1 - 2 Cans of beer per week     Social History     Substance and Sexual Activity   Drug Use Yes   • Frequency: 7 0 times per week   • Types: Marijuana    Comment: Medical Marijuana Card     Social History     Tobacco Use   Smoking Status Former   • Packs/day: 0 25   • Years: 0 00   • Pack years: 0 00   • Types: Cigarettes   • Start date:    • Quit date:    • Years since quittin 0   Smokeless Tobacco Never     Family History   Problem Relation Age of Onset   • Hypertension Father    • Alcohol abuse Father    • Alcohol abuse Sister    • Depression Sister    • Anxiety disorder Sister    • Leukemia Maternal Grandmother    • Depression Maternal Grandmother    • Suicide Attempts Maternal Grandmother    • Diabetes Paternal Grandfather    • Stroke Paternal Grandfather    • Depression Cousin    • Anxiety disorder Cousin    • Self-Injury Cousin    • Suicide Attempts Cousin    • Stroke Paternal Grandmother        Meds/Allergies       Current Outpatient Medications:   •  clobetasol (TEMOVATE) 0 05 % ointment  •  desvenlafaxine (PRISTIQ) 100 mg 24 hr tablet  •  LORazepam (ATIVAN) 0 5 mg tablet  •  norethindrone-ethinyl estradiol (JUNEL FE 1/20) 1-20 MG-MCG per tablet  •  norgestimate-ethinyl estradiol (ORTHO-CYCLEN) 0 25-35 MG-MCG per tablet  •  ondansetron (ZOFRAN) 4 mg tablet  •  Elagolix Sodium (Orilissa) 150 MG TABS  •  levothyroxine 25 mcg tablet  •  norethindrone-ethinyl estradiol (JUNEL FE 1/20) 1-20 MG-MCG per tablet  •  omeprazole (PriLOSEC) 20 mg delayed release capsule    Allergies   Allergen Reactions   • Codeine Itching   • Levaquin [Levofloxacin] Myalgia           Objective     Blood pressure 122/80, temperature 98 8 °F (37 1 °C), temperature source Tympanic, height 5' 4" (1 626 m), weight 81 4 kg (179 lb 6 4 oz), not currently breastfeeding  Body mass index is 30 79 kg/m²  PHYSICAL EXAM:      General Appearance:   Alert, cooperative, no distress   HEENT:   Normocephalic, atraumatic, anicteric      Neck:  Supple, symmetrical, trachea midline   Lungs:   Clear to auscultation bilaterally; no rales, rhonchi or wheezing; respirations unlabored    Heart[de-identified]   Regular rate and rhythm; no murmur, rub, or gallop  Abdomen:   Soft, non-tender, non-distended; normal bowel sounds; no masses, no organomegaly    Genitalia:   Deferred    Rectal:   Deferred    Extremities:  No cyanosis, clubbing or edema    Pulses:  2+ and symmetric    Skin:  No jaundice, rashes, or lesions    Lymph nodes:  No palpable cervical lymphadenopathy        Lab Results:   No visits with results within 1 Day(s) from this visit     Latest known visit with results is:   Office Visit on 11/01/2022   Component Date Value   • White Blood Cell Count 11/02/2022 5 0    • Red Blood Cell Count 11/02/2022 3 99    • Hemoglobin 11/02/2022 11 3    • HCT 11/02/2022 34 5    • MCV 11/02/2022 87    • MCH 11/02/2022 28 3    • MCHC 11/02/2022 32 8    • RDW 11/02/2022 13 6    • Platelet Count 60/08/6578 285    • Neutrophils 11/02/2022 61    • Lymphocytes 11/02/2022 29    • Monocytes 11/02/2022 7    • Eosinophils 11/02/2022 2    • Basophils PCT 11/02/2022 1    • Neutrophils (Absolute) 11/02/2022 3 1    • Lymphocytes (Absolute) 11/02/2022 1 5    • Monocytes (Absolute) 11/02/2022 0 4    • Eosinophils (Absolute) 11/02/2022 0 1    • Basophils ABS 11/02/2022 0 0    • Immature Granulocytes 11/02/2022 0    • Immature Granulocytes (A* 11/02/2022 0 0    • Glucose, Random 11/02/2022 89    • BUN 11/02/2022 12    • Creatinine 11/02/2022 0 69    • eGFR 11/02/2022 116    • SL AMB BUN/CREATININE RA* 11/02/2022 17    • Sodium 11/02/2022 139    • Potassium 11/02/2022 4 4    • Chloride 11/02/2022 103    • CO2 11/02/2022 24    • CALCIUM 11/02/2022 9 1    • Protein, Total 11/02/2022 6 2    • Albumin 11/02/2022 4 1    • Globulin, Total 11/02/2022 2 1    • Albumin/Globulin Ratio 11/02/2022 2 0    • TOTAL BILIRUBIN 11/02/2022 0 2    • Alk Phos Isoenzymes 11/02/2022 101    • AST 11/02/2022 14    • ALT 11/02/2022 14    • C-Reactive Protein, Quant 11/02/2022 9    • GGT 11/02/2022 14    • H Pylori IgG 11/02/2022 0 12    • Lipase, Serum 11/02/2022 26    • Gliadin IgA 11/02/2022 3    • Gliadin IgG 11/02/2022 2    • TISSUE TRANSGLUTAMINASE * 11/02/2022 <2    • Tissue Transglut Ab IGG 11/02/2022 <2    • Endomysial IgA 11/02/2022 Negative    • IgA 11/02/2022 157          Radiology Results:   No results found    Answers for HPI/ROS submitted by the patient on 1/5/2023  Chronicity: chronic  Onset: more than 1 year ago  Onset quality: sudden  Frequency: every several days  Episode duration: 1 Days  Progression since onset: waxing and waning  Pain location: generalized abdominal region  Pain - numeric: 10/10  Radiates to: does not radiate  anorexia: No  arthralgias: No  belching: No  constipation: No  diarrhea: No  dysuria: No  fever: No  flatus: Yes  frequency: No  headaches: No  hematochezia: No  hematuria: No  melena: No  myalgias: No  nausea: Yes  weight loss: No  vomiting: No  Relieved by: nothing, being still, eating, recumbency, vomiting

## 2023-01-20 ENCOUNTER — HOSPITAL ENCOUNTER (OUTPATIENT)
Dept: ULTRASOUND IMAGING | Facility: HOSPITAL | Age: 38
End: 2023-01-20

## 2023-01-20 DIAGNOSIS — R10.11 RIGHT UPPER QUADRANT ABDOMINAL PAIN: ICD-10-CM

## 2023-01-25 ENCOUNTER — TELEPHONE (OUTPATIENT)
Dept: FAMILY MEDICINE CLINIC | Facility: CLINIC | Age: 38
End: 2023-01-25

## 2023-01-25 NOTE — TELEPHONE ENCOUNTER
LEFT VM FOR PT        ----- Message from Kathy Howe MD sent at 1/25/2023 12:58 PM EST -----  Neg US  ----- Message -----  From: Interface, Radiology Results In  Sent: 1/25/2023  12:36 PM EST  To: Kathy Howe MD

## 2023-02-10 RX ORDER — SODIUM CHLORIDE 9 MG/ML
125 INJECTION, SOLUTION INTRAVENOUS CONTINUOUS
Status: CANCELLED | OUTPATIENT
Start: 2023-02-10

## 2023-02-11 ENCOUNTER — ANESTHESIA (OUTPATIENT)
Dept: ANESTHESIOLOGY | Facility: HOSPITAL | Age: 38
End: 2023-02-11

## 2023-02-11 ENCOUNTER — ANESTHESIA EVENT (OUTPATIENT)
Dept: ANESTHESIOLOGY | Facility: HOSPITAL | Age: 38
End: 2023-02-11

## 2023-02-11 NOTE — ANESTHESIA PREPROCEDURE EVALUATION
Procedure:  PRE-OP ONLY    Relevant Problems   ANESTHESIA (within normal limits)      CARDIO (within normal limits)      ENDO (within normal limits)      GI/HEPATIC (within normal limits)      /RENAL (within normal limits)      GYN (within normal limits)      HEMATOLOGY (within normal limits)      MUSCULOSKELETAL (within normal limits)      NEURO/PSYCH   (+) Anxiety   (+) Current episode of major depressive disorder without prior episode      PULMONARY (within normal limits)             Anesthesia Plan  ASA Score- 2     Anesthesia Type- IV sedation with anesthesia with ASA Monitors  Additional Monitors:   Airway Plan:           Plan Factors-Exercise tolerance (METS): >4 METS  Chart reviewed  Patient summary reviewed  Patient is a current smoker  Patient instructed to abstain from smoking on day of procedure  Patient did not smoke on day of surgery  Induction- intravenous  Postoperative Plan-     Informed Consent- Anesthetic plan and risks discussed with patient

## 2023-02-13 ENCOUNTER — ANESTHESIA EVENT (OUTPATIENT)
Dept: GASTROENTEROLOGY | Facility: MEDICAL CENTER | Age: 38
End: 2023-02-13

## 2023-02-13 ENCOUNTER — HOSPITAL ENCOUNTER (OUTPATIENT)
Dept: GASTROENTEROLOGY | Facility: MEDICAL CENTER | Age: 38
Setting detail: OUTPATIENT SURGERY
Discharge: HOME | End: 2023-02-13
Admitting: INTERNAL MEDICINE

## 2023-02-13 ENCOUNTER — ANESTHESIA (OUTPATIENT)
Dept: GASTROENTEROLOGY | Facility: MEDICAL CENTER | Age: 38
End: 2023-02-13

## 2023-02-13 VITALS
HEART RATE: 69 BPM | DIASTOLIC BLOOD PRESSURE: 77 MMHG | SYSTOLIC BLOOD PRESSURE: 128 MMHG | BODY MASS INDEX: 30.56 KG/M2 | HEIGHT: 64 IN | OXYGEN SATURATION: 100 % | TEMPERATURE: 98.1 F | WEIGHT: 179 LBS | RESPIRATION RATE: 18 BRPM

## 2023-02-13 DIAGNOSIS — R11.0 CHRONIC NAUSEA: ICD-10-CM

## 2023-02-13 DIAGNOSIS — K29.71 GASTRITIS WITH HEMORRHAGE, UNSPECIFIED CHRONICITY, UNSPECIFIED GASTRITIS TYPE: Primary | ICD-10-CM

## 2023-02-13 DIAGNOSIS — R14.0 BLOATING: ICD-10-CM

## 2023-02-13 LAB
EXT PREGNANCY TEST URINE: NEGATIVE
EXT. CONTROL: NORMAL

## 2023-02-13 RX ORDER — SODIUM CHLORIDE 9 MG/ML
125 INJECTION, SOLUTION INTRAVENOUS CONTINUOUS
Status: DISCONTINUED | OUTPATIENT
Start: 2023-02-13 | End: 2023-02-17 | Stop reason: HOSPADM

## 2023-02-13 RX ORDER — LIDOCAINE HYDROCHLORIDE 20 MG/ML
INJECTION, SOLUTION EPIDURAL; INFILTRATION; INTRACAUDAL; PERINEURAL AS NEEDED
Status: DISCONTINUED | OUTPATIENT
Start: 2023-02-13 | End: 2023-02-13

## 2023-02-13 RX ORDER — PROPOFOL 10 MG/ML
INJECTION, EMULSION INTRAVENOUS AS NEEDED
Status: DISCONTINUED | OUTPATIENT
Start: 2023-02-13 | End: 2023-02-13

## 2023-02-13 RX ORDER — MIDAZOLAM HYDROCHLORIDE 2 MG/2ML
INJECTION, SOLUTION INTRAMUSCULAR; INTRAVENOUS AS NEEDED
Status: DISCONTINUED | OUTPATIENT
Start: 2023-02-13 | End: 2023-02-13

## 2023-02-13 RX ORDER — OMEPRAZOLE 40 MG/1
40 CAPSULE, DELAYED RELEASE ORAL DAILY
Qty: 30 CAPSULE | Refills: 5 | Status: SHIPPED | OUTPATIENT
Start: 2023-02-13 | End: 2023-08-12

## 2023-02-13 RX ADMIN — Medication 20 MG: at 12:27

## 2023-02-13 RX ADMIN — PROPOFOL 100 MG: 10 INJECTION, EMULSION INTRAVENOUS at 12:27

## 2023-02-13 RX ADMIN — MIDAZOLAM 2 MG: 1 INJECTION INTRAMUSCULAR; INTRAVENOUS at 12:24

## 2023-02-13 RX ADMIN — SODIUM CHLORIDE 125 ML/HR: 0.9 INJECTION, SOLUTION INTRAVENOUS at 11:44

## 2023-02-13 RX ADMIN — LIDOCAINE HYDROCHLORIDE 100 MG: 20 INJECTION, SOLUTION EPIDURAL; INFILTRATION; INTRACAUDAL; PERINEURAL at 12:27

## 2023-02-13 NOTE — ANESTHESIA POSTPROCEDURE EVALUATION
Post-Op Assessment Note    CV Status:  Stable    Pain management: adequate     Mental Status:  Alert and awake   Hydration Status:  Euvolemic   PONV Controlled:  Controlled   Airway Patency:  Patent      Post Op Vitals Reviewed: Yes      Staff: Anesthesiologist         No notable events documented      /77 (02/13/23 1253)    Temp      Pulse 69 (02/13/23 1253)   Resp 18 (02/13/23 1253)    SpO2 100 % (02/13/23 1253)

## 2023-02-13 NOTE — ANESTHESIA PREPROCEDURE EVALUATION
Procedure:  EGD    Relevant Problems   ANESTHESIA (within normal limits)      CARDIO (within normal limits)      ENDO (within normal limits)      GI/HEPATIC (within normal limits)      /RENAL (within normal limits)      GYN (within normal limits)      HEMATOLOGY (within normal limits)      MUSCULOSKELETAL (within normal limits)      NEURO/PSYCH   (+) Anxiety   (+) Current episode of major depressive disorder without prior episode      PULMONARY (within normal limits)        Physical Exam    Airway    Mallampati score: III  TM Distance: >3 FB  Neck ROM: full     Dental   No notable dental hx     Cardiovascular  Rhythm: regular, Rate: normal, Cardiovascular exam normal    Pulmonary  Pulmonary exam normal Breath sounds clear to auscultation,     Other Findings        Anesthesia Plan  ASA Score- 2     Anesthesia Type- IV sedation with anesthesia with ASA Monitors  Additional Monitors:   Airway Plan:           Plan Factors-Exercise tolerance (METS): >4 METS  Chart reviewed  Patient summary reviewed  Patient is a current smoker  Patient instructed to abstain from smoking on day of procedure  Patient did not smoke on day of surgery  Induction- intravenous  Postoperative Plan-     Informed Consent- Anesthetic plan and risks discussed with patient

## 2023-02-13 NOTE — H&P
History and Physical - [facility]  Whit Robin 30 y o  female MRN: [id number]                  HPI: Whit Robin is a 30y o  year old female who presents for EGD to investigate nausea and bloating  REVIEW OF SYSTEMS: Per the HPI, and otherwise unremarkable      Historical Information   Past Medical History:   Diagnosis Date   • Anxiety    • Depression     Current seeing a psychiatrist   • Positive depression screening     resolved 19   • Preeclampsia, third trimester 2022    Delivered 1 month early -- took BP meds for 6 weeks after delivery     Past Surgical History:   Procedure Laterality Date   • BREAST SURGERY Bilateral    •  SECTION  2022   • REFRACTIVE SURGERY     • TONSILLECTOMY     • UPPER GASTROINTESTINAL ENDOSCOPY       Social History   Social History     Substance and Sexual Activity   Alcohol Use Yes   • Alcohol/week: 2 0 - 4 0 standard drinks   • Types: 1 - 2 Glasses of wine, 1 - 2 Cans of beer per week     Social History     Substance and Sexual Activity   Drug Use Yes   • Frequency: 7 0 times per week   • Types: Marijuana    Comment: Medical Marijuana Card     Social History     Tobacco Use   Smoking Status Former   • Packs/day: 0 25   • Years: 0 00   • Pack years: 0 00   • Types: Cigarettes   • Start date:    • Quit date:    • Years since quittin 1   Smokeless Tobacco Never     Family History   Problem Relation Age of Onset   • Hypertension Father    • Alcohol abuse Father    • Alcohol abuse Sister    • Depression Sister    • Anxiety disorder Sister    • Leukemia Maternal Grandmother    • Depression Maternal Grandmother    • Suicide Attempts Maternal Grandmother    • Diabetes Paternal Grandfather    • Stroke Paternal Grandfather    • Depression Cousin    • Anxiety disorder Cousin    • Self-Injury Cousin    • Suicide Attempts Cousin    • Stroke Paternal Grandmother        Meds/Allergies     (Not in a hospital admission)      Allergies Allergen Reactions   • Codeine Itching   • Levaquin [Levofloxacin] Myalgia       Objective     not currently breastfeeding  PHYSICAL EXAM    Gen: NAD  CV: RRR  CHEST: Clear  ABD: soft, NT/ND  EXT: no edema      ASSESSMENT/PLAN:  Amaris Scurry is a 30y o  year old female who presents for EGD to investigate nausea and bloating  The patient is stable and optimized for the procedure, we reviewed risk and benefits  Risk include but not limited to infection, bleeding, perforation and missing a lesion

## 2023-03-01 ENCOUNTER — TELEPHONE (OUTPATIENT)
Dept: GASTROENTEROLOGY | Facility: CLINIC | Age: 38
End: 2023-03-01

## 2023-03-27 ENCOUNTER — OFFICE VISIT (OUTPATIENT)
Dept: FAMILY MEDICINE CLINIC | Facility: CLINIC | Age: 38
End: 2023-03-27

## 2023-03-27 VITALS
TEMPERATURE: 99.2 F | DIASTOLIC BLOOD PRESSURE: 82 MMHG | HEART RATE: 76 BPM | BODY MASS INDEX: 28.34 KG/M2 | WEIGHT: 166 LBS | SYSTOLIC BLOOD PRESSURE: 120 MMHG | HEIGHT: 64 IN | OXYGEN SATURATION: 99 %

## 2023-03-27 DIAGNOSIS — K21.9 GASTROESOPHAGEAL REFLUX DISEASE WITHOUT ESOPHAGITIS: ICD-10-CM

## 2023-03-27 DIAGNOSIS — M26.609 TMJ (TEMPOROMANDIBULAR JOINT DISORDER): Primary | ICD-10-CM

## 2023-03-27 RX ORDER — FAMOTIDINE 40 MG/1
40 TABLET, FILM COATED ORAL DAILY
Qty: 30 TABLET | Refills: 2 | Status: SHIPPED | OUTPATIENT
Start: 2023-03-27

## 2023-03-27 NOTE — PATIENT INSTRUCTIONS
Dry mouth guard and softer diet  Avoid excessive chewing  Tylenol as needed or some topical application as discussed  Trial of adding famotidine 40 mg in the evening to your omeprazole 40 g in the morning

## 2023-03-27 NOTE — PROGRESS NOTES
[id number]        NAME: Terrell Rosales is a 30 y o  female  : 1985    MRN: [id number]  DATE: 2023  TIME: 3:19 PM    Assessment and Plan   TMJ (temporomandibular joint disorder) [M26 609]  1  TMJ (temporomandibular joint disorder)        2  Gastroesophageal reflux disease without esophagitis  famotidine (PEPCID) 40 MG tablet          No problem-specific Assessment & Plan notes found for this encounter  Patient Instructions     Patient Instructions   Dry mouth guard and softer diet  Avoid excessive chewing  Tylenol as needed or some topical application as discussed  Trial of adding famotidine 40 mg in the evening to your omeprazole 40 g in the morning  Chief Complaint     Chief Complaint   Patient presents with   • Nausea     Clark Braulio throat,cough  No fever  No covid test         History of Present Illness       Patient comes into the evaluation of right ear pain  Also has history of GERD on omeprazole  Having increasing nausea over the last 1 week  Did have EGD done in February  Review of Systems   Review of Systems   Constitutional: Negative for appetite change, chills, diaphoresis and fever  HENT: Positive for ear pain  Negative for congestion, rhinorrhea, sinus pressure and sore throat  Eyes: Negative for discharge, redness and itching  Respiratory: Negative for cough, shortness of breath and wheezing  Cardiovascular: Negative for chest pain and palpitations  Rapid or slow heart rate   Gastrointestinal: Positive for nausea  Negative for abdominal pain, anal bleeding, blood in stool, diarrhea and vomiting           Current Medications       Current Outpatient Medications:   •  clobetasol (TEMOVATE) 0 05 % ointment, 1 application Every 12 hours, Disp: , Rfl:   •  desvenlafaxine (PRISTIQ) 100 mg 24 hr tablet, Take 100 mg by mouth daily, Disp: , Rfl:   •  famotidine (PEPCID) 40 MG tablet, Take 1 tablet (40 mg total) by mouth daily, Disp: 30 tablet, Rfl: 2  •  LORazepam (ATIVAN) 0 5 mg tablet, Take 1 tablet (0 5 mg total) by mouth every 8 (eight) hours as needed for anxiety, Disp: 60 tablet, Rfl: 1  •  omeprazole (PriLOSEC) 40 MG capsule, Take 1 capsule (40 mg total) by mouth daily, Disp: 30 capsule, Rfl: 5  •  norethindrone-ethinyl estradiol (JUNEL FE 1/20) 1-20 MG-MCG per tablet, every 24 hours, Disp: , Rfl:   •  ondansetron (ZOFRAN) 4 mg tablet, Take 1 tablet (4 mg total) by mouth every 8 (eight) hours as needed for nausea or vomiting, Disp: 30 tablet, Rfl: 5    Current Allergies     Allergies as of 2023 - Reviewed 2023   Allergen Reaction Noted   • Codeine Itching 2020   • Levaquin [levofloxacin] Myalgia 10/20/2022            The following portions of the patient's history were reviewed and updated as appropriate: allergies, current medications, past family history, past medical history, past social history, past surgical history and problem list      Past Medical History:   Diagnosis Date   • Anxiety    • Chronic nausea    • Depression     Current seeing a psychiatrist   • Positive depression screening     resolved 19   • Preeclampsia, third trimester 2022    Delivered 1 month early -- took BP meds for 6 weeks after delivery       Past Surgical History:   Procedure Laterality Date   • BREAST SURGERY Bilateral     implants and removal   •  SECTION  2022   • REFRACTIVE SURGERY     • TONSILLECTOMY     • UPPER GASTROINTESTINAL ENDOSCOPY         Family History   Problem Relation Age of Onset   • Hypertension Father    • Alcohol abuse Father    • Alcohol abuse Sister    • Depression Sister    • Anxiety disorder Sister    • Leukemia Maternal Grandmother    • Depression Maternal Grandmother    • Suicide Attempts Maternal Grandmother    • Diabetes Paternal Grandfather    • Stroke Paternal Grandfather    • Depression Cousin    • Anxiety disorder Cousin    • "Self-Injury Cousin    • Suicide Attempts Cousin    • Stroke Paternal Grandmother          Medications have been verified  Objective   /82 (BP Location: Left arm, Patient Position: Sitting, Cuff Size: Standard)   Pulse 76   Temp 99 2 °F (37 3 °C) (Tympanic)   Ht 5' 4\" (1 626 m)   Wt 75 3 kg (166 lb)   SpO2 99%   BMI 28 49 kg/m²        Physical Exam     Physical Exam  Constitutional:       General: She is not in acute distress  Appearance: She is well-developed  She is not diaphoretic  HENT:      Head: Normocephalic and atraumatic  Right Ear: Tympanic membrane and external ear normal       Left Ear: Tympanic membrane and external ear normal       Ears:      Comments: Tenderness at the right TMJ  No crepitus noted  Nose: Nose normal       Mouth/Throat:      Mouth: Mucous membranes are moist       Pharynx: No posterior oropharyngeal erythema  Eyes:      Extraocular Movements: Extraocular movements intact  Pupils: Pupils are equal, round, and reactive to light  Neck:      Thyroid: No thyromegaly  Cardiovascular:      Rate and Rhythm: Normal rate and regular rhythm  Heart sounds: Normal heart sounds  Pulmonary:      Effort: Pulmonary effort is normal  No respiratory distress  Breath sounds: Normal breath sounds  No wheezing  Abdominal:      General: Bowel sounds are normal  There is no distension  Palpations: Abdomen is soft  There is no mass  Tenderness: There is no abdominal tenderness  There is no guarding or rebound  Hernia: No hernia is present  Musculoskeletal:      Right shoulder: No tenderness  Cervical back: Neck supple  Lymphadenopathy:      Cervical: No cervical adenopathy  Skin:     General: Skin is warm and dry  Neurological:      Mental Status: She is alert and oriented to person, place, and time                     "

## 2023-03-30 ENCOUNTER — TELEPHONE (OUTPATIENT)
Dept: GASTROENTEROLOGY | Facility: CLINIC | Age: 38
End: 2023-03-30

## 2023-03-30 NOTE — TELEPHONE ENCOUNTER
LMOM for patient to call back to reschedule appointment on 5/19 with Dr Trudy Jimenes  There is availability on 5/18

## 2023-05-02 ENCOUNTER — OFFICE VISIT (OUTPATIENT)
Dept: FAMILY MEDICINE CLINIC | Facility: CLINIC | Age: 38
End: 2023-05-02

## 2023-05-02 VITALS
HEART RATE: 106 BPM | TEMPERATURE: 97.6 F | DIASTOLIC BLOOD PRESSURE: 89 MMHG | OXYGEN SATURATION: 95 % | WEIGHT: 165 LBS | SYSTOLIC BLOOD PRESSURE: 132 MMHG | BODY MASS INDEX: 28.32 KG/M2

## 2023-05-02 DIAGNOSIS — B34.9 VIRAL INFECTION: Primary | ICD-10-CM

## 2023-05-02 RX ORDER — NORETHINDRONE ACETATE AND ETHINYL ESTRADIOL 1; .02 MG/1; MG/1
1 TABLET ORAL DAILY
COMMUNITY
Start: 2023-04-26

## 2023-05-02 NOTE — PATIENT INSTRUCTIONS
Discussed viral vs allergic vs bacterial infection  Flonase and OTC allergy medication as directed daily  Continue OTC cough/cold medications as directed  Call or return for problems/concerns

## 2023-05-02 NOTE — PROGRESS NOTES
[facility]        NAME: Susanna Tineo is a 41 y o  female  : 1985    MRN: [id number]  DATE: May 2, 2023  TIME: 1:28 PM    Assessment and Plan   Viral infection [B34 9]  1  Viral infection              Patient Instructions     Patient Instructions     Discussed viral vs allergic vs bacterial infection  Flonase and OTC allergy medication as directed daily  Continue OTC cough/cold medications as directed  Call or return for problems/concerns          Chief Complaint     Chief Complaint   Patient presents with    Sore Throat    Nasal Congestion         History of Present Illness       C/o Nasal congestion, sore throat, headache x 2 days taking OTC cold medication with little relief  Review of Systems   Review of Systems   Constitutional: Negative for activity change, chills, fatigue and fever  HENT: Positive for congestion, postnasal drip, rhinorrhea and sore throat  Negative for ear pain and sinus pressure  Eyes: Negative for pain, discharge and redness  Respiratory: Negative for cough and wheezing  Cardiovascular: Negative for chest pain  Gastrointestinal: Negative for constipation, diarrhea, nausea and vomiting  Musculoskeletal: Negative for myalgias  Skin: Negative for rash  Neurological: Positive for headaches  Negative for dizziness           Current Medications       Current Outpatient Medications:     clobetasol (TEMOVATE) 0 05 % ointment, 1 application Every 12 hours, Disp: , Rfl:     desvenlafaxine (PRISTIQ) 100 mg 24 hr tablet, Take 100 mg by mouth daily, Disp: , Rfl:     famotidine (PEPCID) 40 MG tablet, Take 1 tablet (40 mg total) by mouth daily, Disp: 30 tablet, Rfl: 2    LORazepam (ATIVAN) 0 5 mg tablet, Take 1 tablet (0 5 mg total) by mouth every 8 (eight) hours as needed for anxiety, Disp: 60 tablet, Rfl: 1    norethindrone-ethinyl estradiol (MICROGESTIN /20) 1-20 MG-MCG per tablet, Take 1 tablet by mouth daily, Disp: , Rfl:     omeprazole (PriLOSEC) 40 MG capsule, Take 1 capsule (40 mg total) by mouth daily, Disp: 30 capsule, Rfl: 5    Current Allergies     Allergies as of 2023 - Reviewed 2023   Allergen Reaction Noted    Codeine Itching 2020    Levaquin [levofloxacin] Myalgia 10/20/2022            The following portions of the patient's history were reviewed and updated as appropriate: allergies, current medications, past family history, past medical history, past social history, past surgical history and problem list      Past Medical History:   Diagnosis Date    Anxiety     Chronic nausea     Depression     Current seeing a psychiatrist    Positive depression screening     resolved 17    Preeclampsia, third trimester 2022    Delivered 1 month early -- took BP meds for 6 weeks after delivery       Past Surgical History:   Procedure Laterality Date    BREAST SURGERY Bilateral     implants and removal     SECTION  2022    REFRACTIVE SURGERY      TONSILLECTOMY      UPPER GASTROINTESTINAL ENDOSCOPY         Family History   Problem Relation Age of Onset    Hypertension Father     Alcohol abuse Father     Alcohol abuse Sister     Depression Sister     Anxiety disorder Sister     Leukemia Maternal Grandmother     Depression Maternal Grandmother     Suicide Attempts Maternal Grandmother     Diabetes Paternal Grandfather     Stroke Paternal Grandfather     Depression Cousin     Anxiety disorder Cousin     Self-Injury Cousin     Suicide Attempts Cousin     Stroke Paternal Grandmother          Medications have been verified  Objective   /89 (BP Location: Left arm, Patient Position: Sitting, Cuff Size: Standard)   Pulse (!) 106   Temp 97 6 °F (36 4 °C) (Tympanic)   Wt 74 8 kg (165 lb)   SpO2 95%   BMI 28 32 kg/m²        Physical Exam     Physical Exam  Vitals and nursing note reviewed  Constitutional:       General: She is not in acute distress       Appearance: She is well-developed  She is not ill-appearing, toxic-appearing or diaphoretic  HENT:      Head: Normocephalic  Right Ear: Tympanic membrane and ear canal normal  No drainage, swelling or tenderness  No middle ear effusion  Tympanic membrane is not erythematous  Left Ear: Tympanic membrane and ear canal normal  No drainage, swelling or tenderness  No middle ear effusion  Tympanic membrane is not erythematous  Nose: Congestion and rhinorrhea present  Mouth/Throat:      Mouth: Mucous membranes are moist  No oral lesions  Pharynx: Oropharynx is clear  Posterior oropharyngeal erythema present  No pharyngeal swelling, oropharyngeal exudate or uvula swelling  Tonsils: No tonsillar exudate or tonsillar abscesses  Eyes:      Conjunctiva/sclera: Conjunctivae normal    Cardiovascular:      Rate and Rhythm: Normal rate and regular rhythm  Heart sounds: Normal heart sounds  No murmur heard  Pulmonary:      Effort: Pulmonary effort is normal  No respiratory distress  Breath sounds: Normal breath sounds  No wheezing or rhonchi  Musculoskeletal:      Cervical back: Normal range of motion and neck supple  Skin:     General: Skin is warm and dry  Coloration: Skin is not pale  Findings: No erythema or rash  Neurological:      Mental Status: She is alert and oriented to person, place, and time     Psychiatric:         Mood and Affect: Mood normal          Behavior: Behavior normal

## 2023-05-02 NOTE — LETTER
May 2, 2023     Patient: Anel Rucker  YOB: 1985  Date of Visit: 5/2/2023      To Whom it May Concern:    Anel Rucker is under my professional care  Roland Izquierdo was seen in my office on 5/2/2023  Roland Izquierdo may return to work on 5/4/23  If you have any questions or concerns, please don't hesitate to call           Sincerely,          Merlinda Neas, CRNP        CC: No Recipients

## 2023-05-04 ENCOUNTER — TELEPHONE (OUTPATIENT)
Dept: FAMILY MEDICINE CLINIC | Facility: CLINIC | Age: 38
End: 2023-05-04

## 2023-05-05 DIAGNOSIS — R11.0 NAUSEA: Primary | ICD-10-CM

## 2023-05-05 RX ORDER — ONDANSETRON 4 MG/1
4 TABLET, FILM COATED ORAL EVERY 8 HOURS PRN
Qty: 30 TABLET | Refills: 3 | Status: SHIPPED | OUTPATIENT
Start: 2023-05-05

## 2023-05-18 ENCOUNTER — OFFICE VISIT (OUTPATIENT)
Dept: GASTROENTEROLOGY | Facility: CLINIC | Age: 38
End: 2023-05-18

## 2023-05-18 VITALS
HEIGHT: 64 IN | DIASTOLIC BLOOD PRESSURE: 74 MMHG | SYSTOLIC BLOOD PRESSURE: 122 MMHG | WEIGHT: 167 LBS | BODY MASS INDEX: 28.51 KG/M2 | TEMPERATURE: 97.9 F

## 2023-05-18 DIAGNOSIS — R14.0 BLOATING: ICD-10-CM

## 2023-05-18 DIAGNOSIS — R11.0 CHRONIC NAUSEA: Primary | ICD-10-CM

## 2023-05-18 DIAGNOSIS — R10.11 RIGHT UPPER QUADRANT ABDOMINAL PAIN: ICD-10-CM

## 2023-05-18 DIAGNOSIS — K29.71 GASTRITIS WITH HEMORRHAGE, UNSPECIFIED CHRONICITY, UNSPECIFIED GASTRITIS TYPE: ICD-10-CM

## 2023-05-18 NOTE — PROGRESS NOTES
[facility] - Outpatient Follow-up Note  Margarita Richards 41 y o  female MRN: [id number]  Encounter: [id number]          ASSESSMENT AND PLAN:      1   Nausea  2  Bloating  3  Gastritis    Discussed with patient about continuing to avoid NSAIDs  Omeprazole and Pepcid daily  After 6 months course patient can try without the medications and monitor symptoms  If symptoms recur then patient may need to restart medication and continue it indefinitely  Patient agreeable with plan  RTC 6 months  Rolando Brown MD  Gastroenterology  [place] [address]  Date: May 18, 2023    ______________________________________________________________________    SUBJECTIVE: 43-year-old with depression, anxiety, hypothyroidism, gastritis presents for follow-up  During last visit patient reported nausea intermittently along with bloating for few months  She also reported history of smoking marijuana in the past   Gastric emptying study in 2016  Patient was having nausea at the time of gastric emptying study in the past   Review of labs done by me showed that H  pylori IgG was negative, CRP was normal and other labs getting CBC, CMP did not show any significant findings  EGD was then performed which showed hemorrhagic gastritis  Stomach biopsies showed chronic inflammation  Ultrasound was performed which was normal   Patient was started on omeprazole 40 mg daily  Patient reports that she started taking omeprazole after the procedure  Symptoms are somewhat controlled  She was then seen by primary care physician who started her on Pepcid nightly which she has also been taking  Currently symptoms of nausea and bloating are controlled with resolved and Pepcid intake  Normal bowel movements  She was taking Advil in the past before she saw me for the first time visit  Since then she has stopped NSAID intake  REVIEW OF SYSTEMS IS OTHERWISE NEGATIVE        Historical Information Past Medical History:   Diagnosis Date   • Anxiety    • Chronic nausea    • Depression     Current seeing a psychiatrist   • Positive depression screening     resolved 17   • Preeclampsia, third trimester 2022    Delivered 1 month early -- took BP meds for 6 weeks after delivery     Past Surgical History:   Procedure Laterality Date   • BREAST SURGERY Bilateral     implants and removal   •  SECTION  2022   • REFRACTIVE SURGERY     • TONSILLECTOMY     • UPPER GASTROINTESTINAL ENDOSCOPY       Social History   Social History     Substance and Sexual Activity   Alcohol Use Yes   • Alcohol/week: 2 0 - 4 0 standard drinks   • Types: 1 - 2 Glasses of wine, 1 - 2 Cans of beer per week    Comment: 3 days per week     Social History     Substance and Sexual Activity   Drug Use Yes   • Frequency: 7 0 times per week   • Types: Marijuana    Comment: Medical Marijuana Card     Social History     Tobacco Use   Smoking Status Former   • Packs/day: 0 25   • Years: 0 00   • Pack years: 0 00   • Types: Cigarettes   • Start date:    • Quit date:    • Years since quittin 3   Smokeless Tobacco Never     Family History   Problem Relation Age of Onset   • Hypertension Father    • Alcohol abuse Father    • Alcohol abuse Sister    • Depression Sister    • Anxiety disorder Sister    • Leukemia Maternal Grandmother    • Depression Maternal Grandmother    • Suicide Attempts Maternal Grandmother    • Diabetes Paternal Grandfather    • Stroke Paternal Grandfather    • Depression Cousin    • Anxiety disorder Cousin    • Self-Injury Cousin    • Suicide Attempts Cousin    • Stroke Paternal Grandmother        Meds/Allergies       Current Outpatient Medications:   •  clobetasol (TEMOVATE) 0 05 % ointment  •  desvenlafaxine (PRISTIQ) 100 mg 24 hr tablet  •  famotidine (PEPCID) 40 MG tablet  •  LORazepam (ATIVAN) 0 5 mg tablet  •  norethindrone-ethinyl estradiol (MICROGESTIN ) 1-20 MG-MCG per tablet  • "omeprazole (PriLOSEC) 40 MG capsule  •  ondansetron (ZOFRAN) 4 mg tablet    Allergies   Allergen Reactions   • Codeine Itching   • Levaquin [Levofloxacin] Myalgia           Objective     Blood pressure 122/74, temperature 97 9 °F (36 6 °C), temperature source Tympanic, height 5' 4\" (1 626 m), weight 75 8 kg (167 lb), not currently breastfeeding  Body mass index is 28 67 kg/m²  PHYSICAL EXAM:      General Appearance:   Alert, cooperative, no distress   HEENT:   Normocephalic, atraumatic, anicteric      Neck:  Supple, symmetrical, trachea midline   Lungs:   Clear to auscultation bilaterally; no rales, rhonchi or wheezing; respirations unlabored    Heart[de-identified]   Regular rate and rhythm; no murmur, rub, or gallop  Abdomen:   Soft, non-tender, non-distended; normal bowel sounds; no masses, no organomegaly    Genitalia:   Deferred    Rectal:   Deferred    Extremities:  No cyanosis, clubbing or edema    Pulses:  2+ and symmetric    Skin:  No jaundice, rashes, or lesions    Lymph nodes:  No palpable cervical lymphadenopathy        Lab Results:   No visits with results within 1 Day(s) from this visit  Latest known visit with results is:   Hospital Outpatient Visit on 02/13/2023   Component Date Value   • EXT Preg Test, Ur 02/13/2023 Negative    • Control 02/13/2023 Valid    • Case Report 02/13/2023                      Value:Surgical Pathology Report                         Case: [id number]                                   Authorizing Provider:  Teressa Neely MD         Collected:           02/13/2023 1229              Ordering Location:     [facility]        Received:            02/13/2023 [address] [place] [address] Endoscopy                                                     Pathologist:           Jigar Foy MD                                                                  Specimen:    Stomach, random gastric bx r/o H   Pylori                                                  • " Final Diagnosis 02/13/2023                      Value: This result contains rich text formatting which cannot be displayed here  • Additional Information 02/13/2023                      Value: This result contains rich text formatting which cannot be displayed here  • Gross Description 02/13/2023                      Value: This result contains rich text formatting which cannot be displayed here  Radiology Results:   No results found

## 2023-07-01 DIAGNOSIS — K21.9 GASTROESOPHAGEAL REFLUX DISEASE WITHOUT ESOPHAGITIS: ICD-10-CM

## 2023-07-01 RX ORDER — FAMOTIDINE 40 MG/1
TABLET, FILM COATED ORAL
Qty: 30 TABLET | Refills: 2 | Status: SHIPPED | OUTPATIENT
Start: 2023-07-01

## 2023-08-07 ENCOUNTER — TELEPHONE (OUTPATIENT)
Dept: OTHER | Facility: OTHER | Age: 38
End: 2023-08-07

## 2023-08-21 ENCOUNTER — OFFICE VISIT (OUTPATIENT)
Dept: FAMILY MEDICINE CLINIC | Facility: CLINIC | Age: 38
End: 2023-08-21
Payer: COMMERCIAL

## 2023-08-21 VITALS
OXYGEN SATURATION: 99 % | BODY MASS INDEX: 28 KG/M2 | WEIGHT: 164 LBS | TEMPERATURE: 98.9 F | SYSTOLIC BLOOD PRESSURE: 128 MMHG | DIASTOLIC BLOOD PRESSURE: 86 MMHG | HEIGHT: 64 IN | RESPIRATION RATE: 18 BRPM | HEART RATE: 84 BPM

## 2023-08-21 DIAGNOSIS — Z00.00 WELLNESS EXAMINATION: Primary | ICD-10-CM

## 2023-08-21 DIAGNOSIS — F32.0 CURRENT MILD EPISODE OF MAJOR DEPRESSIVE DISORDER WITHOUT PRIOR EPISODE (HCC): ICD-10-CM

## 2023-08-21 DIAGNOSIS — B02.9 HERPES ZOSTER WITHOUT COMPLICATION: ICD-10-CM

## 2023-08-21 DIAGNOSIS — F41.9 ANXIETY: ICD-10-CM

## 2023-08-21 PROCEDURE — 99214 OFFICE O/P EST MOD 30 MIN: CPT | Performed by: FAMILY MEDICINE

## 2023-08-21 PROCEDURE — 99395 PREV VISIT EST AGE 18-39: CPT | Performed by: FAMILY MEDICINE

## 2023-08-21 RX ORDER — VALACYCLOVIR HYDROCHLORIDE 1 G/1
1000 TABLET, FILM COATED ORAL 3 TIMES DAILY
Qty: 21 TABLET | Refills: 0 | Status: SHIPPED | OUTPATIENT
Start: 2023-08-21 | End: 2023-08-28

## 2023-08-21 NOTE — PROGRESS NOTES
HPI:  Katrina Birch is a 41 y.o. female here for her yearly health maintenance exam.   Patient Active Problem List   Diagnosis   • Current episode of major depressive disorder without prior episode   • Anxiety     Past Medical History:   Diagnosis Date   • Anxiety    • Chronic nausea    • Depression 2002    Current seeing a psychiatrist   • Positive depression screening     resolved 7/18/17   • Preeclampsia, third trimester 06/07/2022    Delivered 1 month early -- took BP meds for 6 weeks after delivery       1. Advanced Directive: n     2. Durable Power of  for Healthcare: n     3. Social History:           Drug and alcohol History: n                  4. Immunizations up to date: y                 Lifestyle:                           Healthy Diet:y                          Alcohol Use:n                          Tobacco Use:n                          Regular exercise:y                          Weight concerns:n                               5.  Over the past 2 weeks, how often have you been bothered by the following:              Little interest or pleasure in doing things:n              Felling down, depressed or hopeless:n       Current Outpatient Medications   Medication Sig Dispense Refill   • clobetasol (TEMOVATE) 0.05 % ointment 1 application Every 12 hours     • desvenlafaxine (PRISTIQ) 100 mg 24 hr tablet Take 100 mg by mouth daily     • famotidine (PEPCID) 40 MG tablet take 1 tablet by mouth once daily 30 tablet 2   • LORazepam (ATIVAN) 0.5 mg tablet Take 1 tablet (0.5 mg total) by mouth every 8 (eight) hours as needed for anxiety 60 tablet 1   • norethindrone-ethinyl estradiol (MICROGESTIN 1/20) 1-20 MG-MCG per tablet Take 1 tablet by mouth daily     • ondansetron (ZOFRAN) 4 mg tablet Take 1 tablet (4 mg total) by mouth every 8 (eight) hours as needed for nausea or vomiting 30 tablet 3   • valACYclovir (VALTREX) 1,000 mg tablet Take 1 tablet (1,000 mg total) by mouth 3 (three) times a day for 7 days 21 tablet 0     No current facility-administered medications for this visit. Allergies   Allergen Reactions   • Codeine Itching   • Levaquin [Levofloxacin] Myalgia     Immunization History   Administered Date(s) Administered   • Tdap 04/19/2012       Patient Care Team:  Moisés Wies MD as PCP - General (Family Medicine)  Moisés Wise MD as PCP - [facility] - [place] ([place])    Review of Systems   Constitutional: Negative for fatigue, fever and unexpected weight change. HENT: Negative for congestion, sinus pain and sore throat. Eyes: Negative for visual disturbance. Respiratory: Negative for shortness of breath and wheezing. Cardiovascular: Negative for chest pain and palpitations. Gastrointestinal: Negative for abdominal pain, nausea and vomiting. Musculoskeletal: Negative. Negative for arthralgias and myalgias. Neurological: Negative for syncope, weakness and numbness. Psychiatric/Behavioral: Negative. Negative for confusion, dysphoric mood and suicidal ideas. Physical Exam :  Physical Exam  Constitutional:       Appearance: She is well-developed. HENT:      Right Ear: Ear canal normal. Tympanic membrane is not injected. Left Ear: Ear canal normal. Tympanic membrane is not injected. Nose: Nose normal.   Eyes:      General:         Right eye: No discharge. Left eye: No discharge. Conjunctiva/sclera: Conjunctivae normal.      Pupils: Pupils are equal, round, and reactive to light. Neck:      Thyroid: No thyromegaly. Cardiovascular:      Rate and Rhythm: Normal rate and regular rhythm. Heart sounds: Normal heart sounds. No murmur heard. Pulmonary:      Effort: Pulmonary effort is normal. No respiratory distress. Breath sounds: Normal breath sounds. No wheezing. Abdominal:      General: Bowel sounds are normal. There is no distension. Palpations: Abdomen is soft. Tenderness: There is no abdominal tenderness.    Musculoskeletal: General: Normal range of motion. Cervical back: Normal range of motion and neck supple. Lymphadenopathy:      Cervical: No cervical adenopathy. Skin:     General: Skin is warm and dry. Neurological:      Mental Status: She is alert and oriented to person, place, and time. She is not disoriented. Sensory: No sensory deficit. Gait: Gait normal.      Deep Tendon Reflexes: Reflexes are normal and symmetric. Psychiatric:         Speech: Speech normal.         Behavior: Behavior normal.         Thought Content: Thought content normal.         Judgment: Judgment normal.           Assessment and Plan:  1. Wellness examination        2. Anxiety        3. Current mild episode of major depressive disorder without prior episode ([address] )        4.  Herpes zoster without complication  valACYclovir (VALTREX) 1,000 mg tablet          Health Maintenance Due   Topic Date Due   • Hepatitis C Screening  Never done   • COVID-19 Vaccine (1) Never done   • HIV Screening  Never done   • Cervical Cancer Screening  Never done   • BMI: Followup Plan  02/27/2021   • DTaP,Tdap,and Td Vaccines (2 - Td or Tdap) 04/19/2022   • Annual Physical  07/25/2023   • Influenza Vaccine (1) 09/01/2023

## 2023-08-21 NOTE — PROGRESS NOTES
Assessment/Plan:    Anxiety  Stable on med marij and prn ativan       Diagnoses and all orders for this visit:    Wellness examination    Anxiety    Current mild episode of major depressive disorder without prior episode ([address])    Herpes zoster without complication  -     valACYclovir (VALTREX) 1,000 mg tablet; Take 1 tablet (1,000 mg total) by mouth 3 (three) times a day for 7 days          Subjective:   Chief Complaint   Patient presents with   • Physical Exam        Patient ID: Ayad Mckay is a 41 y.o. female. HPI    The following portions of the patient's history were reviewed and updated as appropriate: allergies, current medications, past family history, past medical history, past social history, past surgical history and problem list.    Review of Systems   Constitutional: Negative for chills and fever. HENT: Negative for congestion, facial swelling, sinus pressure and sore throat. Eyes: Negative for visual disturbance. Respiratory: Negative for shortness of breath and wheezing. Cardiovascular: Negative for chest pain and palpitations. Gastrointestinal: Negative for abdominal pain. Musculoskeletal: Negative for myalgias. Skin: Negative for rash. Neurological: Negative for syncope, weakness and numbness. Psychiatric/Behavioral: Negative for confusion and hallucinations. Objective:  Vitals:    08/21/23 0809   BP: 128/86   BP Location: Left arm   Patient Position: Sitting   Cuff Size: Adult   Pulse: 84   Resp: 18   Temp: 98.9 °F (37.2 °C)   TempSrc: Tympanic   SpO2: 99%   Weight: 74.4 kg (164 lb)   Height: 5' 4" (1.626 m)      Physical Exam  Constitutional:       General: She is not in acute distress. Appearance: She is well-developed. She is not diaphoretic. HENT:      Right Ear: Tympanic membrane, ear canal and external ear normal. Tympanic membrane is not injected. Left Ear: Tympanic membrane, ear canal and external ear normal. Tympanic membrane is not injected. Nose: Nose normal.   Eyes:      General: Lids are normal.      Conjunctiva/sclera: Conjunctivae normal.      Pupils: Pupils are equal, round, and reactive to light. Neck:      Thyroid: No thyromegaly. Cardiovascular:      Rate and Rhythm: Normal rate and regular rhythm. Heart sounds: Normal heart sounds. No murmur heard. Pulmonary:      Effort: Pulmonary effort is normal. No respiratory distress. Breath sounds: Normal breath sounds. No wheezing. Abdominal:      General: Bowel sounds are normal.      Palpations: Abdomen is soft. There is no hepatomegaly or splenomegaly. Tenderness: There is no abdominal tenderness. Musculoskeletal:         General: No tenderness or deformity. Normal range of motion. Cervical back: Normal range of motion and neck supple. Skin:     General: Skin is warm and dry. Coloration: Skin is not pale. Findings: No rash. Neurological:      Mental Status: She is alert and oriented to person, place, and time. She is not disoriented. Sensory: No sensory deficit. Gait: Gait normal.      Deep Tendon Reflexes: Reflexes are normal and symmetric.    Psychiatric:         Speech: Speech normal.         Behavior: Behavior normal.

## 2023-08-22 ENCOUNTER — TELEPHONE (OUTPATIENT)
Dept: ADMINISTRATIVE | Facility: OTHER | Age: 38
End: 2023-08-22

## 2023-08-22 NOTE — LETTER
Procedure Request Form: Cervical Cancer Screening      Date Requested: 23  Patient: Katrina Birch  Patient : 1985   Referring Provider: Colt Barakat MD        Date of Procedure ______________________________       The above patient has informed us that they have completed their   most recent Cervical Cancer Screening at your facility. Please complete   this form and attach all corresponding procedure reports/results. Comments __________________________________________________________  ____________________________________________________________________  ____________________________________________________________________  ____________________________________________________________________    Facility Completing Procedure _________________________________________    Form Completed By (print name) _______________________________________      Signature __________________________________________________________      These reports are needed for  compliance. Please fax this completed form and a copy of the procedure report to our office located at [address] [place] [address] as soon as possible to Fax [phone] attention Emma: Phone [phone]    We thank you for your assistance in treating our mutual patient. Vaccination Request Form: Tdap      Date Requested: 23  Patient: Katrina Birch  Patient : 1985   Referring Provider: Colt Barakat MD       The above patient has informed us that they have had their   most recent Tdap administered at your facility. Please   complete this form and attach all corresponding documentation.     Date of Vaccine(s) Given  ______________________________    Lot Number(s) _______________________________________    Manufacture(s) ______________________________________    Dose Amount (s) _____________________________________    Expiration Date(s) ____________________________________    Comments __________________________________________________________  ____________________________________________________________________  ____________________________________________________________________  ____________________________________________________________________    Administering Facility  ________________________________________________    Vaccine Administered By (print name) ___________________________________      Form Completed By (print name) _______________________________________      Signature ___________________________________________________________      These reports are needed for  compliance. Please fax this completed form and a copy of the Vaccine Document(s) to our office located at [address] [place] [address] as soon as possible to Fax [phone] attention Emma: Phone [phone]    We thank you for your assistance in treating our mutual patient.

## 2023-08-22 NOTE — LETTER
Vaccination Request Form: Tdap      Date Requested: 23  Patient: Janeen Solid  Patient : 1985   Referring Provider: Orquidea Grant MD       The above patient has informed us that they have had their   most recent Tdap administered at your facility. Please   complete this form and attach all corresponding documentation. Date of Vaccine(s) Given  ______________________________    Lot Number(s) _______________________________________    Manufacture(s) ______________________________________    Dose Amount (s) _____________________________________    Expiration Date(s) ____________________________________    Comments __________________________________________________________  ____________________________________________________________________  ____________________________________________________________________  ____________________________________________________________________    Administering Facility  ________________________________________________    Vaccine Administered By (print name) ___________________________________      Form Completed By (print name) _______________________________________      Signature ___________________________________________________________      These reports are needed for  compliance. Please fax this completed form and a copy of the Vaccine Document(s) to our office located at [address] [place] [address] as soon as possible to Fax [phone] attention Emma: Phone [phone]    We thank you for your assistance in treating our mutual patient.

## 2023-08-22 NOTE — LETTER
Procedure Request Form: Cervical Cancer Screening      Date Requested: 23  Patient: Ravinder Nichols  Patient : 1985   Referring Provider: Mary Lindo MD        Date of Procedure ______________________________       The above patient has informed us that they have completed their   most recent Cervical Cancer Screening at your facility. Please complete   this form and attach all corresponding procedure reports/results. Comments __________________________________________________________  ____________________________________________________________________  ____________________________________________________________________  ____________________________________________________________________    Facility Completing Procedure _________________________________________    Form Completed By (print name) _______________________________________      Signature __________________________________________________________      These reports are needed for  compliance. Please fax this completed form and a copy of the procedure report to our office located at [address] [place] [address] as soon as possible to Fax [phone] attention Emma: Phone [phone]    We thank you for your assistance in treating our mutual patient.

## 2023-08-22 NOTE — TELEPHONE ENCOUNTER
----- Message from Alysha Vu sent at 8/21/2023  8:27 AM EDT -----  Regarding: Quality Outreach  08/21/23 8:28 AM    Hel, our patient Mauri Banks has had Pap Smear (HPV) aka Cervical Cancer Screening completed/performed. Please assist in updating the patient chart by making an External outreach to [address] [place] [address] facility located in [address]. The date of service is within the last year. Thank you,  Alysha Vu  [facility]    08/21/23 8:49 AM    Hel, our patient Mauri Banks has had Immunization(s) TDAP completed/performed. Please assist in updating the patient chart by making an External outreach to [address] [place] [address] facility located in [address]. Most recent TDAP.        Thank you,  Lynn Ortiz  [facility]

## 2023-08-23 NOTE — TELEPHONE ENCOUNTER
Upon review of the In Basket request we were able to locate, review, and update the patient chart as requested for Pap Smear (HPV) aka Cervical Cancer Screening. Any additional questions or concerns should be emailed to the Practice Liaisons via the appropriate education email address, please do not reply via In Basket.     Thank you  Geovani Méndez MA

## 2023-08-23 NOTE — TELEPHONE ENCOUNTER
Upon review of the In Basket request and the patient's chart, initial outreach has been made via fax to facility. Please see Contacts section for details.      Thank you  Annamarie Ny MA

## 2023-08-31 NOTE — TELEPHONE ENCOUNTER
Upon review of the In Basket request we were able to locate, review, and update the patient chart as requested for Immunization(s) Tdap. Any additional questions or concerns should be emailed to the Practice Liaisons via the appropriate education email address, please do not reply via In Basket.     Thank you  Christina Mckinley MA

## 2023-09-17 DIAGNOSIS — F41.9 ANXIETY: ICD-10-CM

## 2023-09-18 RX ORDER — LORAZEPAM 0.5 MG/1
0.5 TABLET ORAL EVERY 8 HOURS PRN
Qty: 60 TABLET | Refills: 5 | Status: SHIPPED | OUTPATIENT
Start: 2023-09-18

## 2023-10-10 DIAGNOSIS — K21.9 GASTROESOPHAGEAL REFLUX DISEASE WITHOUT ESOPHAGITIS: ICD-10-CM

## 2023-10-10 RX ORDER — FAMOTIDINE 40 MG/1
TABLET, FILM COATED ORAL
Qty: 30 TABLET | Refills: 2 | Status: SHIPPED | OUTPATIENT
Start: 2023-10-10

## 2023-11-01 ENCOUNTER — TELEPHONE (OUTPATIENT)
Dept: GASTROENTEROLOGY | Facility: CLINIC | Age: 38
End: 2023-11-01

## 2024-01-02 DIAGNOSIS — L30.8 OTHER ECZEMA: Primary | ICD-10-CM

## 2024-01-03 RX ORDER — CLOBETASOL PROPIONATE 0.5 MG/G
1 OINTMENT TOPICAL 2 TIMES DAILY
Qty: 60 G | Refills: 5 | Status: SHIPPED | OUTPATIENT
Start: 2024-01-03

## 2024-02-06 ENCOUNTER — OFFICE VISIT (OUTPATIENT)
Dept: FAMILY MEDICINE CLINIC | Facility: CLINIC | Age: 39
End: 2024-02-06
Payer: COMMERCIAL

## 2024-02-06 VITALS — WEIGHT: 173 LBS | DIASTOLIC BLOOD PRESSURE: 70 MMHG | SYSTOLIC BLOOD PRESSURE: 120 MMHG | BODY MASS INDEX: 29.7 KG/M2

## 2024-02-06 DIAGNOSIS — S50.841S: Primary | ICD-10-CM

## 2024-02-06 DIAGNOSIS — R59.9 REACTIVE LYMPHADENOPATHY: ICD-10-CM

## 2024-02-06 PROBLEM — S50.841A: Status: ACTIVE | Noted: 2024-02-06

## 2024-02-06 PROCEDURE — 99214 OFFICE O/P EST MOD 30 MIN: CPT | Performed by: FAMILY MEDICINE

## 2024-02-06 RX ORDER — PREDNISONE 20 MG/1
TABLET ORAL
Qty: 15 TABLET | Refills: 1 | Status: SHIPPED | OUTPATIENT
Start: 2024-02-06

## 2024-03-17 DIAGNOSIS — K21.9 GASTROESOPHAGEAL REFLUX DISEASE WITHOUT ESOPHAGITIS: ICD-10-CM

## 2024-03-18 RX ORDER — FAMOTIDINE 40 MG/1
TABLET, FILM COATED ORAL
Qty: 30 TABLET | Refills: 2 | Status: SHIPPED | OUTPATIENT
Start: 2024-03-18

## 2024-04-07 DIAGNOSIS — K21.9 GASTROESOPHAGEAL REFLUX DISEASE WITHOUT ESOPHAGITIS: ICD-10-CM

## 2024-04-08 RX ORDER — FAMOTIDINE 40 MG/1
40 TABLET, FILM COATED ORAL DAILY
Qty: 30 TABLET | Refills: 0 | Status: SHIPPED | OUTPATIENT
Start: 2024-04-08

## 2024-04-10 DIAGNOSIS — Z00.6 ENCOUNTER FOR EXAMINATION FOR NORMAL COMPARISON OR CONTROL IN CLINICAL RESEARCH PROGRAM: ICD-10-CM

## 2024-04-19 ENCOUNTER — APPOINTMENT (OUTPATIENT)
Dept: LAB | Facility: HOSPITAL | Age: 39
End: 2024-04-19

## 2024-04-19 DIAGNOSIS — Z00.6 ENCOUNTER FOR EXAMINATION FOR NORMAL COMPARISON OR CONTROL IN CLINICAL RESEARCH PROGRAM: ICD-10-CM

## 2024-04-19 PROCEDURE — 36415 COLL VENOUS BLD VENIPUNCTURE: CPT

## 2024-04-30 DIAGNOSIS — K21.9 GASTROESOPHAGEAL REFLUX DISEASE WITHOUT ESOPHAGITIS: ICD-10-CM

## 2024-05-01 RX ORDER — FAMOTIDINE 40 MG/1
40 TABLET, FILM COATED ORAL DAILY
Qty: 30 TABLET | Refills: 5 | Status: SHIPPED | OUTPATIENT
Start: 2024-05-01

## 2024-05-02 ENCOUNTER — OFFICE VISIT (OUTPATIENT)
Dept: FAMILY MEDICINE CLINIC | Facility: CLINIC | Age: 39
End: 2024-05-02
Payer: COMMERCIAL

## 2024-05-02 VITALS
SYSTOLIC BLOOD PRESSURE: 122 MMHG | WEIGHT: 177 LBS | OXYGEN SATURATION: 99 % | DIASTOLIC BLOOD PRESSURE: 74 MMHG | HEIGHT: 64 IN | BODY MASS INDEX: 30.22 KG/M2

## 2024-05-02 DIAGNOSIS — R53.83 OTHER FATIGUE: ICD-10-CM

## 2024-05-02 DIAGNOSIS — G47.33 OSA (OBSTRUCTIVE SLEEP APNEA): Primary | ICD-10-CM

## 2024-05-02 PROCEDURE — 3725F SCREEN DEPRESSION PERFORMED: CPT | Performed by: FAMILY MEDICINE

## 2024-05-02 PROCEDURE — 99214 OFFICE O/P EST MOD 30 MIN: CPT | Performed by: FAMILY MEDICINE

## 2024-05-02 RX ORDER — FLUTICASONE PROPIONATE 50 MCG
1 SPRAY, SUSPENSION (ML) NASAL DAILY
Qty: 48 G | Refills: 3 | Status: SHIPPED | OUTPATIENT
Start: 2024-05-02

## 2024-05-02 RX ORDER — PHENTERMINE HYDROCHLORIDE 37.5 MG/1
37.5 TABLET ORAL DAILY
Qty: 30 TABLET | Refills: 2 | Status: SHIPPED | OUTPATIENT
Start: 2024-05-02

## 2024-05-02 NOTE — PROGRESS NOTES
"Assessment/Plan:    No problem-specific Assessment & Plan notes found for this encounter.       Diagnoses and all orders for this visit:    GABBY (obstructive sleep apnea)  -     fluticasone (FLONASE) 50 mcg/act nasal spray; 1 spray into each nostril daily          Subjective:   Chief Complaint   Patient presents with    labs review        Patient ID: Malcolm Glaser is a 39 y.o. female.    HPI    The following portions of the patient's history were reviewed and updated as appropriate: allergies, current medications, past family history, past medical history, past social history, past surgical history and problem list.    Review of Systems   Constitutional:  Negative for fatigue, fever and unexpected weight change.   HENT:  Negative for congestion, sinus pain and sore throat.    Eyes:  Negative for visual disturbance.   Respiratory:  Negative for shortness of breath and wheezing.    Cardiovascular:  Negative for chest pain and palpitations.   Gastrointestinal:  Negative for abdominal pain, nausea and vomiting.   Musculoskeletal: Negative.  Negative for arthralgias and myalgias.   Neurological:  Negative for syncope, weakness and numbness.   Psychiatric/Behavioral: Negative.  Negative for confusion, dysphoric mood and suicidal ideas.        Witnessed apnea  Objective:  Vitals:    05/02/24 1433   BP: 122/74   BP Location: Left arm   Patient Position: Sitting   Cuff Size: Standard   SpO2: 99%   Weight: 80.3 kg (177 lb)   Height: 5' 4\" (1.626 m)      Physical Exam  Constitutional:       Appearance: She is well-developed.   HENT:      Right Ear: Ear canal normal. Tympanic membrane is not injected.      Left Ear: Ear canal normal. Tympanic membrane is not injected.      Nose: Nose normal.   Eyes:      General:         Right eye: No discharge.         Left eye: No discharge.      Conjunctiva/sclera: Conjunctivae normal.      Pupils: Pupils are equal, round, and reactive to light.   Neck:      Thyroid: No thyromegaly. "   Cardiovascular:      Rate and Rhythm: Normal rate and regular rhythm.      Heart sounds: Normal heart sounds. No murmur heard.  Pulmonary:      Effort: Pulmonary effort is normal. No respiratory distress.      Breath sounds: Normal breath sounds. No wheezing.   Abdominal:      General: Bowel sounds are normal. There is no distension.      Palpations: Abdomen is soft.      Tenderness: There is no abdominal tenderness.   Musculoskeletal:         General: Normal range of motion.      Cervical back: Normal range of motion and neck supple.   Lymphadenopathy:      Cervical: No cervical adenopathy.   Skin:     General: Skin is warm and dry.   Neurological:      Mental Status: She is alert and oriented to person, place, and time. She is not disoriented.      Sensory: No sensory deficit.      Motor: No weakness.      Coordination: Coordination normal.      Gait: Gait normal.      Deep Tendon Reflexes: Reflexes are normal and symmetric.   Psychiatric:         Speech: Speech normal.         Behavior: Behavior normal.         Thought Content: Thought content normal.         Judgment: Judgment normal.

## 2024-05-07 ENCOUNTER — TELEPHONE (OUTPATIENT)
Age: 39
End: 2024-05-07

## 2024-05-07 NOTE — TELEPHONE ENCOUNTER
PA for Ozempic    Submitted via    []CMM-KEY    [x][employer]-Case ID # [id number]   []Faxed to plan   []Other website    []Phone call Case ID #      Office notes sent, clinical questions answered. Awaiting determination    Turnaround time for your insurance to make a decision on your Prior Authorization can take 7-21 business days.

## 2024-05-08 LAB
APOB+LDLR+PCSK9 GENE MUT ANL BLD/T: NOT DETECTED
BRCA1+BRCA2 DEL+DUP + FULL MUT ANL BLD/T: NOT DETECTED
MLH1+MSH2+MSH6+PMS2 GN DEL+DUP+FUL M: NOT DETECTED

## 2024-05-08 NOTE — TELEPHONE ENCOUNTER
PA for Ozempic Denied    Reason:(Screenshot if applicable)          Message sent to office clinical pool Yes    Denial letter scanned into Media Yes    Appeal started No ( Provider will need to decide if appeal is warranted and send clinical documentation to PA team for initiation.)

## 2024-05-10 ENCOUNTER — TELEPHONE (OUTPATIENT)
Age: 39
End: 2024-05-10

## 2024-05-11 NOTE — TELEPHONE ENCOUNTER
OZEMPIC will not be covered with or without a prior authorization, OZEMPIC  is not FDA approved for obesity, prediabetes, etc. OZEMPIC  is only FDA Approved for Type 2 Diabetes and will only be Approved via a Prior Authorization if patient has a diagnosis of Type 2 Diabetes.

## 2024-08-06 ENCOUNTER — TELEPHONE (OUTPATIENT)
Dept: FAMILY MEDICINE CLINIC | Facility: CLINIC | Age: 39
End: 2024-08-06

## 2024-08-06 NOTE — TELEPHONE ENCOUNTER
Left vm for pt. Scheduled on my chart my 8/16 for physical. Needs to be after 8/21 per insurance. Let pt know, wanted to see if she wanted to come on in for a different reason or if it is for her physical that we would need to reschedule till after 8/21.

## 2024-09-24 DIAGNOSIS — F41.9 ANXIETY: ICD-10-CM

## 2024-09-24 RX ORDER — LORAZEPAM 0.5 MG/1
0.5 TABLET ORAL EVERY 8 HOURS PRN
Qty: 60 TABLET | Refills: 0 | Status: SHIPPED | OUTPATIENT
Start: 2024-09-24

## 2025-01-07 DIAGNOSIS — L23.0 ALLERGIC CONTACT DERMATITIS DUE TO METALS: Primary | ICD-10-CM

## 2025-01-07 RX ORDER — PREDNISONE 20 MG/1
TABLET ORAL
Qty: 15 TABLET | Refills: 0 | Status: SHIPPED | OUTPATIENT
Start: 2025-01-07

## 2025-01-07 RX ORDER — TRIAMCINOLONE ACETONIDE 5 MG/G
CREAM TOPICAL 2 TIMES DAILY
Qty: 45 G | Refills: 5 | Status: SHIPPED | OUTPATIENT
Start: 2025-01-07

## 2025-01-21 DIAGNOSIS — R11.0 NAUSEA: ICD-10-CM

## 2025-01-21 RX ORDER — ONDANSETRON 4 MG/1
4 TABLET, FILM COATED ORAL EVERY 8 HOURS PRN
Qty: 30 TABLET | Refills: 1 | Status: SHIPPED | OUTPATIENT
Start: 2025-01-21

## 2025-05-06 DIAGNOSIS — K21.9 GASTROESOPHAGEAL REFLUX DISEASE WITHOUT ESOPHAGITIS: ICD-10-CM

## 2025-05-08 RX ORDER — FAMOTIDINE 40 MG/1
40 TABLET, FILM COATED ORAL DAILY
Qty: 30 TABLET | Refills: 5 | Status: SHIPPED | OUTPATIENT
Start: 2025-05-08

## 2025-07-08 LAB — HBA1C MFR BLD HPLC: 5.5 %

## 2025-07-15 ENCOUNTER — TELEPHONE (OUTPATIENT)
Age: 40
End: 2025-07-15

## 2025-07-15 ENCOUNTER — TRANSCRIBE ORDERS (OUTPATIENT)
Dept: PERINATAL CARE | Facility: OTHER | Age: 40
End: 2025-07-15

## 2025-07-15 DIAGNOSIS — Z36.0 ENCOUNTER FOR ANTENATAL SCREENING FOR CHROMOSOMAL ANOMALIES: Primary | ICD-10-CM

## 2025-07-24 PROBLEM — O09.521 ELDERLY MULTIGRAVIDA, FIRST TRIMESTER: Status: ACTIVE | Noted: 2025-07-24

## 2025-07-24 PROBLEM — O09.291 HX OF PREECLAMPSIA, PRIOR PREGNANCY, CURRENTLY PREGNANT, FIRST TRIMESTER: Status: ACTIVE | Noted: 2025-07-24

## 2025-07-24 PROBLEM — O09.811 PREGNANCY RESULTING FROM IN VITRO FERTILIZATION IN FIRST TRIMESTER: Status: ACTIVE | Noted: 2025-07-24

## 2025-07-25 ENCOUNTER — ROUTINE PRENATAL (OUTPATIENT)
Dept: PERINATAL CARE | Facility: OTHER | Age: 40
End: 2025-07-25
Attending: OBSTETRICS & GYNECOLOGY

## 2025-07-25 VITALS
HEART RATE: 85 BPM | SYSTOLIC BLOOD PRESSURE: 118 MMHG | BODY MASS INDEX: 28.89 KG/M2 | HEIGHT: 64 IN | WEIGHT: 169.2 LBS | DIASTOLIC BLOOD PRESSURE: 74 MMHG

## 2025-07-25 DIAGNOSIS — O09.521 ELDERLY MULTIGRAVIDA, FIRST TRIMESTER: Primary | ICD-10-CM

## 2025-07-25 DIAGNOSIS — F32.A DEPRESSION COMPLICATING PREGNANCY, ANTEPARTUM, FIRST TRIMESTER: ICD-10-CM

## 2025-07-25 DIAGNOSIS — O09.811 PREGNANCY RESULTING FROM IN VITRO FERTILIZATION IN FIRST TRIMESTER: ICD-10-CM

## 2025-07-25 DIAGNOSIS — O99.341 ANXIETY DURING PREGNANCY, ANTEPARTUM, FIRST TRIMESTER: ICD-10-CM

## 2025-07-25 DIAGNOSIS — Z36.0 ENCOUNTER FOR ANTENATAL SCREENING FOR CHROMOSOMAL ANOMALIES: ICD-10-CM

## 2025-07-25 DIAGNOSIS — Z3A.12 12 WEEKS GESTATION OF PREGNANCY: ICD-10-CM

## 2025-07-25 DIAGNOSIS — O10.911 MATERNAL CHRONIC HYPERTENSION IN FIRST TRIMESTER: ICD-10-CM

## 2025-07-25 DIAGNOSIS — O09.291 HX OF PREECLAMPSIA, PRIOR PREGNANCY, CURRENTLY PREGNANT, FIRST TRIMESTER: ICD-10-CM

## 2025-07-25 DIAGNOSIS — F41.9 ANXIETY DURING PREGNANCY, ANTEPARTUM, FIRST TRIMESTER: ICD-10-CM

## 2025-07-25 DIAGNOSIS — O99.341 DEPRESSION COMPLICATING PREGNANCY, ANTEPARTUM, FIRST TRIMESTER: ICD-10-CM

## 2025-07-25 PROCEDURE — 99244 OFF/OP CNSLTJ NEW/EST MOD 40: CPT | Performed by: OBSTETRICS & GYNECOLOGY

## 2025-07-25 PROCEDURE — 76801 OB US < 14 WKS SINGLE FETUS: CPT | Performed by: OBSTETRICS & GYNECOLOGY

## 2025-07-25 PROCEDURE — 76813 OB US NUCHAL MEAS 1 GEST: CPT | Performed by: OBSTETRICS & GYNECOLOGY

## 2025-07-25 PROCEDURE — NC001 PR NO CHARGE: Performed by: OBSTETRICS & GYNECOLOGY

## 2025-07-25 RX ORDER — ENOXAPARIN SODIUM 300 MG/3ML
30 INJECTION INTRAVENOUS; SUBCUTANEOUS DAILY
COMMUNITY
Start: 2025-06-23

## 2025-07-25 RX ORDER — FOLIC ACID/MULTIVIT,IRON,MINER 0.4MG-18MG
TABLET ORAL
COMMUNITY

## 2025-07-25 RX ORDER — FERROUS SULFATE 325(65) MG
325 TABLET, DELAYED RELEASE (ENTERIC COATED) ORAL
COMMUNITY

## 2025-07-25 RX ORDER — ASPIRIN 81 MG/1
81 TABLET, CHEWABLE ORAL DAILY
COMMUNITY

## 2025-07-25 NOTE — ASSESSMENT & PLAN NOTE
We discussed an increased risk for adverse pregnancy outcomes related to IVF including fetal structural abnormalities including cardiac defects, fetal growth abnormalities including growth restriction, preeclampsia, placental abnormalities, and stillbirth   - detailed anatomy evaluation scheduled at 21 weeks gestation   - fetal echocardiography scheduled at about 24 weeks gestation   - serial growth ultrasound studies during the second half of pregnancy   - weekly nonstress testing beginning at 36 weeks gestation, sooner if otherwise clinically indicated

## 2025-07-25 NOTE — ASSESSMENT & PLAN NOTE
Currently taking 81 mg of aspirin a day as preeclampsia prophylaxis  I recommended increasing low-dose aspirin preeclampsia prophylaxis to 162 mg a day.  Continue low-dose aspirin preeclampsia prophylaxis until 36 weeks gestation   <-- Click to add NO pertinent Past Medical History

## 2025-07-25 NOTE — LETTER
2025     Luann Traore DO  [address] [facility] [address]  [facility] [address]    Patient: Malcolm Glaser   YOB: 1985   Date of Visit: 2025       Dear Dr. Luann Traore, :    Thank you for referring Malcolm Glaser to me for evaluation. Below are my notes for this consultation.    If you have questions, please do not hesitate to call me. I look forward to following your patient along with you.         Sincerely,        Bentley Cotton MD        CC: No Recipients    Bentley Cotton MD  2025  7:45 PM  Sign when Signing Visit  CONSULTATION: MATERNAL-FETAL MEDICINE  Name: Malcolm Glaser      : 1985      MRN: [id number]  Encounter Provider:   1 [facility]  Encounter Date: 2025   Encounter department: [facility]  :  Assessment & Plan  12 weeks gestation of pregnancy       Repeat gestational diabetes screening recommended at 24 to 28 weeks gestation  MSAFP screening at 16-21 weeks  Elderly multigravida, first trimester       NIPT recently revealed low risk results  Normal late first trimester nuchal translucency and detailed MFM fetal anatomy evaluation today  Detailed midtrimester fetal anatomy evaluation and cervical length ultrasound recommended at 21 weeks gestation  Hx of preeclampsia, prior pregnancy, currently pregnant, first trimester       Currently taking 81 mg of aspirin a day as preeclampsia prophylaxis  I recommended increasing low-dose aspirin preeclampsia prophylaxis to 162 mg a day.  Continue low-dose aspirin preeclampsia prophylaxis until 36 weeks gestation  Pregnancy resulting from in vitro fertilization in first trimester       We discussed an increased risk for adverse pregnancy outcomes related to IVF including fetal structural abnormalities including cardiac defects, fetal growth abnormalities including growth restriction, preeclampsia, placental abnormalities, and stillbirth   - detailed anatomy  evaluation scheduled at 21 weeks gestation   - fetal echocardiography scheduled at about 24 weeks gestation   - serial growth ultrasound studies during the second half of pregnancy   - weekly nonstress testing beginning at 36 weeks gestation, sooner if otherwise clinically indicated  Anxiety during pregnancy, antepartum, first trimester         Depression complicating pregnancy, antepartum, first trimester       Currently treated with Pristiq  Encounter for  screening for chromosomal anomalies    Orders:  •  Ambulatory Referral to Maternal Fetal Medicine    Maternal chronic hypertension in first trimester         Pregnancy risks include increased incidence of indicated  birth, superimposed preeclampsia, abruption, fetal growth restriction, and stillbirth  Continue low-dose aspirin preeclampsia prophylaxis is recommended until 36 weeks gestation  Antihypertensive drug therapy is recommended if blood pressure increases to 140/90 or greater  Serial fetal growth ultrasound studies during the second half of the pregnancy    History of Present Illness    Malcolm is a 40 y.o. Black female  at 12+ weeks gestation by IVF presenting for consultation for IVF.  She conceived this pregnancy following single 5-day frozen embryo transfer which was not preceded by preimplantation genetic testing for aneuploidy.  Eggs were retrieved when she was 36 years old.  Malcolm is completing a course of daily low molecular weight heparin therapy, once per day, at the direction of her reproductive endocrinologist.  She reports occasional nausea and vomiting, treated with Unisom and vitamin B6.  She denies vaginal bleeding.  Screening for gestational diabetes on  revealed a normal 1 hour post Glucola value: 107 mg/dL.  A hemoglobin A1c obtained on  was normal at 5.5%.  Noninvasive prenatal testing obtained earlier in pregnancy revealed negative results.  BMI at the initial prenatal visit was 29.3.  A prenatal  office note of July 3 was reviewed prior to the [employer] encounter.    Malcolm has a history of a prior  section in an IVF pregnancy in .  Delivery occurred at 36 weeks gestation following a diagnosis of preeclampsia.  She delivered a 6 pound 5 ounce baby boy, currently healthy.  She also has a history of a first trimester spontaneous pregnancy loss.  Her past medical history is significant for depression, currently treated with Pristiq.  She also has a history of chronic hypertension, currently not treated medically, follow-up prediabetes.  Her past surgical history is otherwise significant for tonsillectomy, breast augmentation surgery, and breast implant removal.  Daily medications include a prenatal vitamin, Pristiq, and 81 mg of aspirin.  Malcolm denies tobacco, alcohol, marijuana, and illicit drug use during the pregnancy.  Her dad has hypertension.  The family medical history is otherwise negative with respect of first-degree relatives with diabetes, hypertension, venous thromboembolism, or preeclampsia.  The family genetic history is negative with respect to genetic abnormalities, birth defects, and mental retardation.    Her Antepartum course is uncomplicated.    Past Medical History  OB History    Para Term  AB Living   3 1  1 1    SAB IAB Ectopic Multiple Live Births   1          # Outcome Date GA Lbr Aron/2nd Weight Sex Type Anes PTL Lv   3 Current            2   36w0d   M  EPI Y       Complications: Failure to Progress in First Stage, Pre-eclampsia   1 SAB              Past Medical History[1]  Past Surgical History[2]        Current Medications[3]  Allergies:   Allergies   Allergen Reactions   • Codeine Itching   • Levaquin [Levofloxacin] Myalgia         Objective  There were no vitals taken for this visit.     No LMP recorded. Patient is pregnant.  Estimated Delivery Date: Not found.    Physical Exam  General appearance: alert, well appearing, and in no distress.  The remainder  "of her physical examination was deferred as she was here today for consultation and discussion.    Please refer to \"Imaging\" for ultrasound report from today's visit.     Pre-visit service time (including reviewing records prior to visit)  - 10 minutes  Face-to-Face time/patient contact - 15 minutes  Post-service time (charting) - 15 minutes     Total time - 40 minutes         [1]   Past Medical History:  Diagnosis Date   • Anxiety    • Chronic nausea    • Depression     Current seeing a psychiatrist   • Positive depression screening     resolved 17   • Preeclampsia, third trimester 2022    Delivered 1 month early -- took BP meds for 6 weeks after delivery   [2]   Past Surgical History:  Procedure Laterality Date   • BREAST SURGERY Bilateral     implants and removal   •  SECTION  2022   • REFRACTIVE SURGERY     • TONSILLECTOMY     • UPPER GASTROINTESTINAL ENDOSCOPY     [3]   Current Outpatient Medications:   •  clobetasol (TEMOVATE) 0.05 % ointment, Apply 1 Application topically 2 (two) times a day, Disp: 60 g, Rfl: 5  •  desvenlafaxine (PRISTIQ) 100 mg 24 hr tablet, Take 100 mg by mouth daily, Disp: , Rfl:   •  famotidine (PEPCID) 40 MG tablet, take 1 tablet by mouth once daily, Disp: 30 tablet, Rfl: 5  •  fluticasone (FLONASE) 50 mcg/act nasal spray, 1 spray into each nostril daily, Disp: 48 g, Rfl: 3  •  LORazepam (ATIVAN) 0.5 mg tablet, Take 1 tablet (0.5 mg total) by mouth every 8 (eight) hours as needed for anxiety, Disp: 60 tablet, Rfl: 0  •  norethindrone-ethinyl estradiol (MICROGESTIN 1/20) 1-20 MG-MCG per tablet, Take 1 tablet by mouth daily, Disp: , Rfl:   •  ondansetron (ZOFRAN) 4 mg tablet, Take 1 tablet (4 mg total) by mouth every 8 (eight) hours as needed for nausea or vomiting, Disp: 30 tablet, Rfl: 1  •  phentermine (ADIPEX-P) 37.5 MG tablet, Take 1 tablet (37.5 mg total) by mouth in the morning, Disp: 30 tablet, Rfl: 2  •  predniSONE 20 mg tablet, TAKE 1 TABLET BID X 5 " DAYS THEN TAKE 1 TABLET QD FOR 5 DAYS, Disp: 15 tablet, Rfl: 1  •  predniSONE 20 mg tablet, TAKE 1 TABLET BID X 5 DAYS THEN TAKE 1 TABLET QD FOR 5 DAYS, Disp: 15 tablet, Rfl: 0  •  semaglutide, 0.25 or 0.5 mg/dose, (Ozempic, 0.25 or 0.5 MG/DOSE,) 2 mg/3 mL injection pen, 0.25 mg under the skin every 7 days for 4 doses (28 days), THEN 0.5 mg under the skin every 7 days, Disp: 9 mL, Rfl: 3  •  triamcinolone (KENALOG) 0.5 % cream, Apply topically 2 (two) times a day, Disp: 45 g, Rfl: 5  •  valACYclovir (VALTREX) 1,000 mg tablet, Take 1 tablet (1,000 mg total) by mouth 3 (three) times a day for 7 days, Disp: 21 tablet, Rfl: 0

## 2025-07-25 NOTE — ASSESSMENT & PLAN NOTE
NIPT recently revealed low risk results  Normal late first trimester nuchal translucency and detailed MFM fetal anatomy evaluation today  Detailed midtrimester fetal anatomy evaluation and cervical length ultrasound recommended at 21 weeks gestation

## 2025-07-25 NOTE — PROGRESS NOTES
CONSULTATION: MATERNAL-FETAL MEDICINE  Name: Malcolm Glaser      : 1985      MRN: [id number]  Encounter Provider:  [facility]  Encounter Date: 2025   Encounter department: [facility]  :  Assessment & Plan  12 weeks gestation of pregnancy       Repeat gestational diabetes screening recommended at 24 to 28 weeks gestation  MSAFP screening at 16-21 weeks  Elderly multigravida, first trimester       NIPT recently revealed low risk results  Normal late first trimester nuchal translucency and detailed MFM fetal anatomy evaluation today  Detailed midtrimester fetal anatomy evaluation and cervical length ultrasound recommended at 21 weeks gestation  Hx of preeclampsia, prior pregnancy, currently pregnant, first trimester       Currently taking 81 mg of aspirin a day as preeclampsia prophylaxis  I recommended increasing low-dose aspirin preeclampsia prophylaxis to 162 mg a day.  Continue low-dose aspirin preeclampsia prophylaxis until 36 weeks gestation  Pregnancy resulting from in vitro fertilization in first trimester       We discussed an increased risk for adverse pregnancy outcomes related to IVF including fetal structural abnormalities including cardiac defects, fetal growth abnormalities including growth restriction, preeclampsia, placental abnormalities, and stillbirth   - detailed anatomy evaluation scheduled at 21 weeks gestation   - fetal echocardiography scheduled at about 24 weeks gestation   - serial growth ultrasound studies during the second half of pregnancy   - weekly nonstress testing beginning at 36 weeks gestation, sooner if otherwise clinically indicated  Anxiety during pregnancy, antepartum, first trimester         Depression complicating pregnancy, antepartum, first trimester       Currently treated with Pristiq  Encounter for  screening for chromosomal anomalies    Orders:    Ambulatory Referral to Maternal Fetal Medicine    Maternal  chronic hypertension in first trimester         Pregnancy risks include increased incidence of indicated  birth, superimposed preeclampsia, abruption, fetal growth restriction, and stillbirth  Continue low-dose aspirin preeclampsia prophylaxis is recommended until 36 weeks gestation  Antihypertensive drug therapy is recommended if blood pressure increases to 140/90 or greater  Serial fetal growth ultrasound studies during the second half of the pregnancy    History of Present Illness     Malcolm is a 40 y.o. Black female  at 12+ weeks gestation by IVF presenting for consultation for IVF.  She conceived this pregnancy following single 5-day frozen embryo transfer which was not preceded by preimplantation genetic testing for aneuploidy.  Eggs were retrieved when she was 36 years old.  Malcolm is completing a course of daily low molecular weight heparin therapy, once per day, at the direction of her reproductive endocrinologist.  She reports occasional nausea and vomiting, treated with Unisom and vitamin B6.  She denies vaginal bleeding.  Screening for gestational diabetes on  revealed a normal 1 hour post Glucola value: 107 mg/dL.  A hemoglobin A1c obtained on  was normal at 5.5%.  Noninvasive prenatal testing obtained earlier in pregnancy revealed negative results.  BMI at the initial prenatal visit was 29.3.  A prenatal office note of July 3 was reviewed prior to the [facility] encounter.    Malcolm has a history of a prior  section in an IVF pregnancy in .  Delivery occurred at 36 weeks gestation following a diagnosis of preeclampsia.  She delivered a 6 pound 5 ounce baby boy, currently healthy.  She also has a history of a first trimester spontaneous pregnancy loss.  Her past medical history is significant for depression, currently treated with Pristiq.  She also has a history of chronic hypertension, currently not treated medically, follow-up prediabetes.  Her past surgical history is  "otherwise significant for tonsillectomy, breast augmentation surgery, and breast implant removal.  Daily medications include a prenatal vitamin, Pristiq, and 81 mg of aspirin.  Malcolm denies tobacco, alcohol, marijuana, and illicit drug use during the pregnancy.  Her dad has hypertension.  The family medical history is otherwise negative with respect of first-degree relatives with diabetes, hypertension, venous thromboembolism, or preeclampsia.  The family genetic history is negative with respect to genetic abnormalities, birth defects, and mental retardation.    Her Antepartum course is uncomplicated.    Past Medical History   OB History    Para Term  AB Living   3 1  1 1    SAB IAB Ectopic Multiple Live Births   1          # Outcome Date GA Lbr Aron/2nd Weight Sex Type Anes PTL Lv   3 Current            2   36w0d   M  EPI Y       Complications: Failure to Progress in First Stage, Pre-eclampsia   1 SAB              Past Medical History[1]  Past Surgical History[2]        Current Medications[3]  Allergies:   Allergies   Allergen Reactions    Codeine Itching    Levaquin [Levofloxacin] Myalgia         Objective   There were no vitals taken for this visit.     No LMP recorded. Patient is pregnant.  Estimated Delivery Date: Not found.    Physical Exam  General appearance: alert, well appearing, and in no distress.  The remainder of her physical examination was deferred as she was here today for consultation and discussion.    Please refer to \"Imaging\" for ultrasound report from today's visit.     Pre-visit service time (including reviewing records prior to visit)  - 10 minutes  Face-to-Face time/patient contact - 15 minutes  Post-service time (charting) - 15 minutes     Total time - 40 minutes         [1]   Past Medical History:  Diagnosis Date    Anxiety     Chronic nausea     Depression     Current seeing a psychiatrist    Positive depression screening     resolved 19    Preeclampsia, " third trimester 2022    Delivered 1 month early -- took BP meds for 6 weeks after delivery   [2]   Past Surgical History:  Procedure Laterality Date    BREAST SURGERY Bilateral     implants and removal     SECTION  2022    REFRACTIVE SURGERY      TONSILLECTOMY      UPPER GASTROINTESTINAL ENDOSCOPY     [3]   Current Outpatient Medications:     clobetasol (TEMOVATE) 0.05 % ointment, Apply 1 Application topically 2 (two) times a day, Disp: 60 g, Rfl: 5    desvenlafaxine (PRISTIQ) 100 mg 24 hr tablet, Take 100 mg by mouth daily, Disp: , Rfl:     famotidine (PEPCID) 40 MG tablet, take 1 tablet by mouth once daily, Disp: 30 tablet, Rfl: 5    fluticasone (FLONASE) 50 mcg/act nasal spray, 1 spray into each nostril daily, Disp: 48 g, Rfl: 3    LORazepam (ATIVAN) 0.5 mg tablet, Take 1 tablet (0.5 mg total) by mouth every 8 (eight) hours as needed for anxiety, Disp: 60 tablet, Rfl: 0    norethindrone-ethinyl estradiol (MICROGESTIN 1/20) 1-20 MG-MCG per tablet, Take 1 tablet by mouth daily, Disp: , Rfl:     ondansetron (ZOFRAN) 4 mg tablet, Take 1 tablet (4 mg total) by mouth every 8 (eight) hours as needed for nausea or vomiting, Disp: 30 tablet, Rfl: 1    phentermine (ADIPEX-P) 37.5 MG tablet, Take 1 tablet (37.5 mg total) by mouth in the morning, Disp: 30 tablet, Rfl: 2    predniSONE 20 mg tablet, TAKE 1 TABLET BID X 5 DAYS THEN TAKE 1 TABLET QD FOR 5 DAYS, Disp: 15 tablet, Rfl: 1    predniSONE 20 mg tablet, TAKE 1 TABLET BID X 5 DAYS THEN TAKE 1 TABLET QD FOR 5 DAYS, Disp: 15 tablet, Rfl: 0    semaglutide, 0.25 or 0.5 mg/dose, (Ozempic, 0.25 or 0.5 MG/DOSE,) 2 mg/3 mL injection pen, 0.25 mg under the skin every 7 days for 4 doses (28 days), THEN 0.5 mg under the skin every 7 days, Disp: 9 mL, Rfl: 3    triamcinolone (KENALOG) 0.5 % cream, Apply topically 2 (two) times a day, Disp: 45 g, Rfl: 5    valACYclovir (VALTREX) 1,000 mg tablet, Take 1 tablet (1,000 mg total) by mouth 3 (three) times a day for 7  days, Disp: 21 tablet, Rfl: 0

## 2025-08-05 ENCOUNTER — TELEPHONE (OUTPATIENT)
Age: 40
End: 2025-08-05